# Patient Record
Sex: FEMALE | Race: WHITE | NOT HISPANIC OR LATINO | Employment: OTHER | URBAN - METROPOLITAN AREA
[De-identification: names, ages, dates, MRNs, and addresses within clinical notes are randomized per-mention and may not be internally consistent; named-entity substitution may affect disease eponyms.]

---

## 2017-12-28 ENCOUNTER — LAB REQUISITION (OUTPATIENT)
Dept: LAB | Facility: HOSPITAL | Age: 57
End: 2017-12-28

## 2017-12-28 DIAGNOSIS — K22.70 BARRETT'S ESOPHAGUS WITHOUT DYSPLASIA: ICD-10-CM

## 2017-12-28 PROCEDURE — 88305 TISSUE EXAM BY PATHOLOGIST: CPT | Performed by: INTERNAL MEDICINE

## 2017-12-29 LAB
CYTO UR: NORMAL
LAB AP CASE REPORT: NORMAL
LAB AP CLINICAL INFORMATION: NORMAL
LAB AP DIAGNOSIS COMMENT: NORMAL
Lab: NORMAL
PATH REPORT.FINAL DX SPEC: NORMAL
PATH REPORT.GROSS SPEC: NORMAL

## 2018-11-30 RX ORDER — EZETIMIBE 10 MG/1
TABLET ORAL
Qty: 30 TABLET | Refills: 10 | OUTPATIENT
Start: 2018-11-30

## 2018-12-03 RX ORDER — EZETIMIBE 10 MG/1
TABLET ORAL
Qty: 30 TABLET | Refills: 10 | OUTPATIENT
Start: 2018-12-03

## 2019-11-21 ENCOUNTER — OUTSIDE FACILITY SERVICE (OUTPATIENT)
Dept: GASTROENTEROLOGY | Facility: CLINIC | Age: 59
End: 2019-11-21

## 2019-11-21 ENCOUNTER — LAB REQUISITION (OUTPATIENT)
Dept: LAB | Facility: HOSPITAL | Age: 59
End: 2019-11-21

## 2019-11-21 DIAGNOSIS — K22.70 BARRETT'S ESOPHAGUS WITHOUT DYSPLASIA: ICD-10-CM

## 2019-11-21 PROCEDURE — 88305 TISSUE EXAM BY PATHOLOGIST: CPT | Performed by: INTERNAL MEDICINE

## 2019-11-21 PROCEDURE — 43239 EGD BIOPSY SINGLE/MULTIPLE: CPT | Performed by: INTERNAL MEDICINE

## 2019-11-21 PROCEDURE — 43249 ESOPH EGD DILATION <30 MM: CPT | Performed by: INTERNAL MEDICINE

## 2019-11-22 LAB
CYTO UR: NORMAL
LAB AP CASE REPORT: NORMAL
LAB AP CLINICAL INFORMATION: NORMAL
PATH REPORT.FINAL DX SPEC: NORMAL
PATH REPORT.GROSS SPEC: NORMAL

## 2020-12-27 ENCOUNTER — APPOINTMENT (OUTPATIENT)
Dept: PREADMISSION TESTING | Facility: HOSPITAL | Age: 60
End: 2020-12-27

## 2021-04-05 RX ORDER — SODIUM, POTASSIUM,MAG SULFATES 17.5-3.13G
2 SOLUTION, RECONSTITUTED, ORAL ORAL TAKE AS DIRECTED
Qty: 354 ML | Refills: 0 | Status: SHIPPED | OUTPATIENT
Start: 2021-04-05

## 2021-04-18 ENCOUNTER — APPOINTMENT (OUTPATIENT)
Dept: PREADMISSION TESTING | Facility: HOSPITAL | Age: 61
End: 2021-04-18

## 2021-04-18 PROCEDURE — U0005 INFEC AGEN DETEC AMPLI PROBE: HCPCS

## 2021-04-18 PROCEDURE — U0004 COV-19 TEST NON-CDC HGH THRU: HCPCS

## 2021-04-18 PROCEDURE — C9803 HOPD COVID-19 SPEC COLLECT: HCPCS

## 2021-04-19 LAB — SARS-COV-2 RNA NOSE QL NAA+PROBE: NOT DETECTED

## 2021-04-20 ENCOUNTER — OUTSIDE FACILITY SERVICE (OUTPATIENT)
Dept: GASTROENTEROLOGY | Facility: CLINIC | Age: 61
End: 2021-04-20

## 2021-04-20 PROCEDURE — G0105 COLORECTAL SCRN; HI RISK IND: HCPCS | Performed by: INTERNAL MEDICINE

## 2022-03-28 ENCOUNTER — TELEPHONE (OUTPATIENT)
Dept: GASTROENTEROLOGY | Facility: CLINIC | Age: 62
End: 2022-03-28

## 2022-03-28 NOTE — TELEPHONE ENCOUNTER
RETURNED PATIENT PHONE MESSAGE, SHE ASK IF WE TAKE HER INS. AND ALSO WHEN SHE IS DUE FOR HER NEXT COLON. 2026

## 2023-07-05 PROBLEM — K21.9 GASTROESOPHAGEAL REFLUX DISEASE: Status: ACTIVE | Noted: 2023-07-05

## 2023-07-05 PROBLEM — R10.32 LEFT LOWER QUADRANT ABDOMINAL PAIN: Status: ACTIVE | Noted: 2023-07-05

## 2023-07-05 PROBLEM — K31.84 GASTROPARESIS: Status: ACTIVE | Noted: 2023-07-05

## 2023-07-05 PROBLEM — K57.92 DIVERTICULITIS: Status: ACTIVE | Noted: 2023-07-05

## 2023-07-05 PROBLEM — R13.19 ESOPHAGEAL DYSPHAGIA: Status: ACTIVE | Noted: 2023-07-05

## 2023-07-26 ENCOUNTER — OUTSIDE FACILITY SERVICE (OUTPATIENT)
Dept: GASTROENTEROLOGY | Facility: CLINIC | Age: 63
End: 2023-07-26
Payer: MEDICARE

## 2023-07-26 DIAGNOSIS — K31.84 GASTROPARESIS: Primary | ICD-10-CM

## 2023-07-26 RX ORDER — METOCLOPRAMIDE HYDROCHLORIDE 15 MG/.07ML
15 SPRAY NASAL
Qty: 9.8 ML | Refills: 0 | COMMUNITY
Start: 2023-07-26

## 2023-07-26 NOTE — PROGRESS NOTES
I provided the patient with sample of Gimoti as requested by Dr. Cardona. I do not recommend use of oral metoclopramide that was previously prescribed with nasal metoclopramide. She may use Gimoti up to four times daily, 1 spray in 1 nostril daily.      NDC: 95826-918-62  [facility] [id number]  Lot 2006  Exp 12/2023

## 2023-08-31 ENCOUNTER — TELEPHONE (OUTPATIENT)
Dept: GASTROENTEROLOGY | Facility: CLINIC | Age: 63
End: 2023-08-31
Payer: MEDICARE

## 2023-08-31 NOTE — TELEPHONE ENCOUNTER
Pt called and stated that she is having a diverticulitis flare up.   Symptoms: Pain when she walks and to touch. She said it is the same feeling she always has cause she has enough flare ups to know the signs.   Duration: Started a couple days ago  Pt is requesting a antibiotic the same as the last time. She said it was a liquid.    Ruba  Okay to start her on a 14-day course of Augmentin 875 mg p.o. twice daily and if she is not coming along or any worsening symptoms such as fevers worsening pain then she needs to present to the emergency department for CT to rule out perforation or abscess.  Lets make sure she catches up with us after completing the antibiotics to give us an update on how she is doing

## 2023-11-06 ENCOUNTER — HOSPITAL ENCOUNTER (OUTPATIENT)
Dept: GENERAL RADIOLOGY | Facility: HOSPITAL | Age: 63
Discharge: HOME | End: 2023-11-06
Payer: MEDICARE

## 2023-11-06 ENCOUNTER — PRE-ADMISSION TESTING (OUTPATIENT)
Dept: PREADMISSION TESTING | Facility: HOSPITAL | Age: 63
End: 2023-11-06
Payer: MEDICARE

## 2023-11-06 VITALS — HEIGHT: 64 IN | BODY MASS INDEX: 26.5 KG/M2 | WEIGHT: 155.2 LBS

## 2023-11-06 LAB
ALBUMIN SERPL-MCNC: 4.3 G/DL (ref 3.5–5.2)
ALBUMIN/GLOB SERPL: 2 G/DL
ALP SERPL-CCNC: 66 U/L (ref 39–117)
ALT SERPL W P-5'-P-CCNC: 31 U/L (ref 1–33)
ANION GAP SERPL CALCULATED.3IONS-SCNC: 7 MMOL/L (ref 5–15)
AST SERPL-CCNC: 27 U/L (ref 1–32)
BILIRUB SERPL-MCNC: 0.5 MG/DL (ref 0–1.2)
BUN SERPL-MCNC: 14 MG/DL (ref 8–23)
BUN/CREAT SERPL: 16.9 (ref 7–25)
CALCIUM SPEC-SCNC: 8.9 MG/DL (ref 8.6–10.5)
CHLORIDE SERPL-SCNC: 101 MMOL/L (ref 98–107)
CO2 SERPL-SCNC: 32 MMOL/L (ref 22–29)
CREAT SERPL-MCNC: 0.83 MG/DL (ref 0.57–1)
DEPRECATED RDW RBC AUTO: 41.8 FL (ref 37–54)
EGFRCR SERPLBLD CKD-EPI 2021: 78.8 ML/MIN/1.73
ERYTHROCYTE [DISTWIDTH] IN BLOOD BY AUTOMATED COUNT: 12.7 % (ref 12.3–15.4)
GLOBULIN UR ELPH-MCNC: 2.1 GM/DL
GLUCOSE SERPL-MCNC: 93 MG/DL (ref 65–99)
HBA1C MFR BLD: 5.4 % (ref 4.8–5.6)
HCT VFR BLD AUTO: 42.9 % (ref 34–46.6)
HGB BLD-MCNC: 14 G/DL (ref 12–15.9)
MCH RBC QN AUTO: 29.4 PG (ref 26.6–33)
MCHC RBC AUTO-ENTMCNC: 32.6 G/DL (ref 31.5–35.7)
MCV RBC AUTO: 90.1 FL (ref 79–97)
PLATELET # BLD AUTO: 287 10*3/MM3 (ref 140–450)
PMV BLD AUTO: 8.7 FL (ref 6–12)
POTASSIUM SERPL-SCNC: 3.7 MMOL/L (ref 3.5–5.2)
PROT SERPL-MCNC: 6.4 G/DL (ref 6–8.5)
RBC # BLD AUTO: 4.76 10*6/MM3 (ref 3.77–5.28)
SODIUM SERPL-SCNC: 140 MMOL/L (ref 136–145)
WBC NRBC COR # BLD: 5.16 10*3/MM3 (ref 3.4–10.8)

## 2023-11-06 PROCEDURE — 80053 COMPREHEN METABOLIC PANEL: CPT

## 2023-11-06 PROCEDURE — 85027 COMPLETE CBC AUTOMATED: CPT

## 2023-11-06 PROCEDURE — 71046 X-RAY EXAM CHEST 2 VIEWS: CPT

## 2023-11-06 PROCEDURE — 36415 COLL VENOUS BLD VENIPUNCTURE: CPT

## 2023-11-06 PROCEDURE — 83036 HEMOGLOBIN GLYCOSYLATED A1C: CPT

## 2023-11-06 PROCEDURE — 93005 ELECTROCARDIOGRAM TRACING: CPT

## 2023-11-06 RX ORDER — FLUTICASONE PROPIONATE 50 MCG
SPRAY, SUSPENSION (ML) NASAL
Status: ON HOLD | COMMUNITY
Start: 2023-11-06

## 2023-11-06 RX ORDER — HYDROCODONE BITARTRATE AND ACETAMINOPHEN 10; 325 MG/1; MG/1
TABLET ORAL
Status: ON HOLD | COMMUNITY
Start: 2023-11-03

## 2023-11-06 NOTE — PAT
An arrival time for procedure was not provided during PAT visit. If patient had any questions or concerns about their arrival time, they were instructed to contact their surgeon/physician.  Additionally, if the patient referred to an arrival time that was acquired from their my chart account, patient was encouraged to verify that time with their surgeon/physician. Arrival times are NOT provided in Pre Admission Testing Department.    Patient viewed general PAT education video as instructed in their preoperative information received from their surgeon.  Patient stated the general PAT education video was viewed in its entirety and survey completed.  Copies of PAT general education handouts (Incentive Spirometry, Meds to Beds Program, Patient Belongings, Pre-op skin preparation instructions, Blood Glucose testing, Visitor policy, Surgery FAQ, Code H) distributed to patient if not printed. Education related to the PAT pass and skin preparation for surgery (if applicable) completed in PAT as a reinforcement to PAT education video. Patient instructed to return PAT pass provided today as well as completed skin preparation sheet (if applicable) on the day of procedure.     Additionally if patient had not viewed video yet but intended to view it at home or in our waiting area, then referred them to the handout with QR code/link provided during PAT visit.  Instructed patient to complete survey after viewing the video in its entirety.  Encouraged patient/family to read PAT general education handouts thoroughly and notify PAT staff with any questions or concerns. Patient verbalized understanding of all information and priority content.    Ten (8 ounce) Impact Advanced Recovery Nutritional Drinks distributed to patient from Pre Admission Testing Department.  Verbal and written instructions given that patient must consume two (8 ounce) Impact drinks a day for five days before surgery.  Flavoring tip sheet provided as well the  "brochure \"Go in Stronger. Get Home Sooner\" that explains benefits of nutritional drinks to enhance recovery. Patient/family verbalized understanding.     Patient instructed to drink 20 ounces of Gatorade or Gatorlyte (if diabetic) and it needs to be completed 1 hour (for Main OR patients) or 2 hours (scheduled  section & [facility] patients) before given arrival time for procedure (NO RED Gatorade and NO Gatorade Zero).    Patient verbalized understanding.    Patient denies any current skin issues.     Patient to apply Chlorhexadine wipes  to surgical area (as instructed) the night before procedure and the AM of procedure. Wipes provided.    EKG IN CHART.      Patient directed to Radiology Department for CXR after Pre Admission Testing Appointment.     "

## 2023-11-07 LAB
QT INTERVAL: 428 MS
QTC INTERVAL: 430 MS

## 2023-11-13 ENCOUNTER — ANESTHESIA EVENT (OUTPATIENT)
Dept: PERIOP | Facility: HOSPITAL | Age: 63
End: 2023-11-13
Payer: MEDICARE

## 2023-11-13 ENCOUNTER — ANESTHESIA EVENT CONVERTED (OUTPATIENT)
Dept: ANESTHESIOLOGY | Facility: HOSPITAL | Age: 63
End: 2023-11-13
Payer: MEDICARE

## 2023-11-13 ENCOUNTER — HOSPITAL ENCOUNTER (INPATIENT)
Facility: HOSPITAL | Age: 63
LOS: 4 days | Discharge: HOME HEALTH | End: 2023-11-17
Attending: COLON & RECTAL SURGERY | Admitting: COLON & RECTAL SURGERY
Payer: MEDICARE

## 2023-11-13 ENCOUNTER — ANESTHESIA (OUTPATIENT)
Dept: PERIOP | Facility: HOSPITAL | Age: 63
End: 2023-11-13
Payer: MEDICARE

## 2023-11-13 DIAGNOSIS — K57.32 DIVERTICULITIS OF LARGE INTESTINE WITHOUT PERFORATION OR ABSCESS WITHOUT BLEEDING: ICD-10-CM

## 2023-11-13 DIAGNOSIS — K31.84 GASTROPARESIS: Primary | ICD-10-CM

## 2023-11-13 DIAGNOSIS — R13.19 ESOPHAGEAL DYSPHAGIA: ICD-10-CM

## 2023-11-13 DIAGNOSIS — K57.92 DIVERTICULITIS: ICD-10-CM

## 2023-11-13 PROCEDURE — 25010000002 DIAZEPAM PER 5 MG: Performed by: COLON & RECTAL SURGERY

## 2023-11-13 PROCEDURE — 0DJD8ZZ INSPECTION OF LOWER INTESTINAL TRACT, VIA NATURAL OR ARTIFICIAL OPENING ENDOSCOPIC: ICD-10-PCS | Performed by: COLON & RECTAL SURGERY

## 2023-11-13 PROCEDURE — 25010000002 BUPIVACAINE (PF) 0.25 % SOLUTION: Performed by: ANESTHESIOLOGY

## 2023-11-13 PROCEDURE — 88302 TISSUE EXAM BY PATHOLOGIST: CPT | Performed by: COLON & RECTAL SURGERY

## 2023-11-13 PROCEDURE — 25810000003 LACTATED RINGERS PER 1000 ML: Performed by: ANESTHESIOLOGY

## 2023-11-13 PROCEDURE — 25010000002 ONDANSETRON PER 1 MG: Performed by: ANESTHESIOLOGY

## 2023-11-13 PROCEDURE — 25010000002 SUGAMMADEX 200 MG/2ML SOLUTION: Performed by: ANESTHESIOLOGY

## 2023-11-13 PROCEDURE — 25010000002 HEPARIN (PORCINE) PER 1000 UNITS: Performed by: COLON & RECTAL SURGERY

## 2023-11-13 PROCEDURE — 0DTJ0ZZ RESECTION OF APPENDIX, OPEN APPROACH: ICD-10-PCS | Performed by: COLON & RECTAL SURGERY

## 2023-11-13 PROCEDURE — 0TN70ZZ RELEASE LEFT URETER, OPEN APPROACH: ICD-10-PCS | Performed by: COLON & RECTAL SURGERY

## 2023-11-13 PROCEDURE — 25010000002 HYDROMORPHONE PER 4 MG: Performed by: COLON & RECTAL SURGERY

## 2023-11-13 PROCEDURE — 0DBN0ZZ EXCISION OF SIGMOID COLON, OPEN APPROACH: ICD-10-PCS | Performed by: COLON & RECTAL SURGERY

## 2023-11-13 PROCEDURE — 88307 TISSUE EXAM BY PATHOLOGIST: CPT | Performed by: COLON & RECTAL SURGERY

## 2023-11-13 PROCEDURE — 25010000002 FENTANYL CITRATE (PF) 100 MCG/2ML SOLUTION: Performed by: NURSE ANESTHETIST, CERTIFIED REGISTERED

## 2023-11-13 PROCEDURE — 0DBP0ZZ EXCISION OF RECTUM, OPEN APPROACH: ICD-10-PCS | Performed by: COLON & RECTAL SURGERY

## 2023-11-13 PROCEDURE — 25010000002 PROPOFOL 10 MG/ML EMULSION: Performed by: NURSE ANESTHETIST, CERTIFIED REGISTERED

## 2023-11-13 PROCEDURE — 25010000002 DEXAMETHASONE PER 1 MG: Performed by: NURSE ANESTHETIST, CERTIFIED REGISTERED

## 2023-11-13 PROCEDURE — 25010000002 ONDANSETRON PER 1 MG

## 2023-11-13 PROCEDURE — 25010000002 DEXAMETHASONE SODIUM PHOSPHATE 10 MG/ML SOLUTION: Performed by: ANESTHESIOLOGY

## 2023-11-13 PROCEDURE — 25010000002 ERTAPENEM PER 500 MG: Performed by: COLON & RECTAL SURGERY

## 2023-11-13 PROCEDURE — 25010000002 FENTANYL CITRATE (PF) 50 MCG/ML SOLUTION

## 2023-11-13 PROCEDURE — 25010000002 HYDROMORPHONE 1 MG/ML SOLUTION

## 2023-11-13 PROCEDURE — 25010000002 ONDANSETRON PER 1 MG: Performed by: COLON & RECTAL SURGERY

## 2023-11-13 DEVICE — PROXIMATE RELOADABLE LINEAR CUTTER WITH SAFETY LOCK-OUT.  55MM LINEAR CUTTER.
Type: IMPLANTABLE DEVICE | Site: ABDOMEN | Status: FUNCTIONAL
Brand: PROXIMATE

## 2023-11-13 DEVICE — ECHELON CIRCULAR POWERED STAPLER
Type: IMPLANTABLE DEVICE | Site: ABDOMEN | Status: FUNCTIONAL
Brand: ECHELON CIRCULAR

## 2023-11-13 RX ORDER — FENTANYL CITRATE 50 UG/ML
INJECTION, SOLUTION INTRAMUSCULAR; INTRAVENOUS
Status: COMPLETED
Start: 2023-11-13 | End: 2023-11-13

## 2023-11-13 RX ORDER — DEXAMETHASONE SODIUM PHOSPHATE 4 MG/ML
INJECTION, SOLUTION INTRA-ARTICULAR; INTRALESIONAL; INTRAMUSCULAR; INTRAVENOUS; SOFT TISSUE AS NEEDED
Status: DISCONTINUED | OUTPATIENT
Start: 2023-11-13 | End: 2023-11-13 | Stop reason: SURG

## 2023-11-13 RX ORDER — DIAZEPAM 5 MG/ML
5 INJECTION, SOLUTION INTRAMUSCULAR; INTRAVENOUS ONCE
Status: COMPLETED | OUTPATIENT
Start: 2023-11-13 | End: 2023-11-13

## 2023-11-13 RX ORDER — ENOXAPARIN SODIUM 100 MG/ML
40 INJECTION SUBCUTANEOUS DAILY
Status: DISCONTINUED | OUTPATIENT
Start: 2023-11-14 | End: 2023-11-17 | Stop reason: HOSPADM

## 2023-11-13 RX ORDER — MELOXICAM 15 MG/1
15 TABLET ORAL ONCE
Status: COMPLETED | OUTPATIENT
Start: 2023-11-13 | End: 2023-11-13

## 2023-11-13 RX ORDER — DEXAMETHASONE SODIUM PHOSPHATE 10 MG/ML
INJECTION, SOLUTION INTRAMUSCULAR; INTRAVENOUS
Status: COMPLETED | OUTPATIENT
Start: 2023-11-13 | End: 2023-11-13

## 2023-11-13 RX ORDER — SODIUM CHLORIDE 9 MG/ML
40 INJECTION, SOLUTION INTRAVENOUS AS NEEDED
Status: DISCONTINUED | OUTPATIENT
Start: 2023-11-13 | End: 2023-11-13 | Stop reason: HOSPADM

## 2023-11-13 RX ORDER — HEPARIN SODIUM 5000 [USP'U]/ML
5000 INJECTION, SOLUTION INTRAVENOUS; SUBCUTANEOUS ONCE
Status: COMPLETED | OUTPATIENT
Start: 2023-11-13 | End: 2023-11-13

## 2023-11-13 RX ORDER — SCOLOPAMINE TRANSDERMAL SYSTEM 1 MG/1
1 PATCH, EXTENDED RELEASE TRANSDERMAL ONCE
Status: DISCONTINUED | OUTPATIENT
Start: 2023-11-13 | End: 2023-11-13

## 2023-11-13 RX ORDER — ONDANSETRON 2 MG/ML
INJECTION INTRAMUSCULAR; INTRAVENOUS
Status: COMPLETED
Start: 2023-11-13 | End: 2023-11-13

## 2023-11-13 RX ORDER — BUPIVACAINE HYDROCHLORIDE 2.5 MG/ML
INJECTION, SOLUTION EPIDURAL; INFILTRATION; INTRACAUDAL
Status: COMPLETED | OUTPATIENT
Start: 2023-11-13 | End: 2023-11-13

## 2023-11-13 RX ORDER — ONDANSETRON 2 MG/ML
4 INJECTION INTRAMUSCULAR; INTRAVENOUS EVERY 6 HOURS PRN
Status: DISCONTINUED | OUTPATIENT
Start: 2023-11-13 | End: 2023-11-17 | Stop reason: HOSPADM

## 2023-11-13 RX ORDER — ONDANSETRON 2 MG/ML
4 INJECTION INTRAMUSCULAR; INTRAVENOUS ONCE AS NEEDED
Status: COMPLETED | OUTPATIENT
Start: 2023-11-13 | End: 2023-11-13

## 2023-11-13 RX ORDER — FENTANYL CITRATE 50 UG/ML
50 INJECTION, SOLUTION INTRAMUSCULAR; INTRAVENOUS
Status: DISCONTINUED | OUTPATIENT
Start: 2023-11-13 | End: 2023-11-13 | Stop reason: HOSPADM

## 2023-11-13 RX ORDER — LIDOCAINE HYDROCHLORIDE 10 MG/ML
INJECTION, SOLUTION EPIDURAL; INFILTRATION; INTRACAUDAL; PERINEURAL AS NEEDED
Status: DISCONTINUED | OUTPATIENT
Start: 2023-11-13 | End: 2023-11-13 | Stop reason: SURG

## 2023-11-13 RX ORDER — FENTANYL CITRATE 50 UG/ML
INJECTION, SOLUTION INTRAMUSCULAR; INTRAVENOUS AS NEEDED
Status: DISCONTINUED | OUTPATIENT
Start: 2023-11-13 | End: 2023-11-13 | Stop reason: SURG

## 2023-11-13 RX ORDER — DEXAMETHASONE SODIUM PHOSPHATE 10 MG/ML
INJECTION, SOLUTION INTRAMUSCULAR; INTRAVENOUS AS NEEDED
Status: DISCONTINUED | OUTPATIENT
Start: 2023-11-13 | End: 2023-11-13

## 2023-11-13 RX ORDER — SODIUM CHLORIDE 0.9 % (FLUSH) 0.9 %
10 SYRINGE (ML) INJECTION EVERY 12 HOURS SCHEDULED
Status: DISCONTINUED | OUTPATIENT
Start: 2023-11-13 | End: 2023-11-13 | Stop reason: HOSPADM

## 2023-11-13 RX ORDER — SODIUM CHLORIDE 0.9 % (FLUSH) 0.9 %
10 SYRINGE (ML) INJECTION AS NEEDED
Status: DISCONTINUED | OUTPATIENT
Start: 2023-11-13 | End: 2023-11-13 | Stop reason: HOSPADM

## 2023-11-13 RX ORDER — ONDANSETRON 2 MG/ML
INJECTION INTRAMUSCULAR; INTRAVENOUS AS NEEDED
Status: DISCONTINUED | OUTPATIENT
Start: 2023-11-13 | End: 2023-11-13 | Stop reason: SURG

## 2023-11-13 RX ORDER — LIDOCAINE HYDROCHLORIDE 10 MG/ML
0.5 INJECTION, SOLUTION EPIDURAL; INFILTRATION; INTRACAUDAL; PERINEURAL ONCE AS NEEDED
Status: COMPLETED | OUTPATIENT
Start: 2023-11-13 | End: 2023-11-13

## 2023-11-13 RX ORDER — ACETAMINOPHEN 500 MG
1000 TABLET ORAL EVERY 8 HOURS
Qty: 12 TABLET | Refills: 0 | Status: DISPENSED | OUTPATIENT
Start: 2023-11-13 | End: 2023-11-15

## 2023-11-13 RX ORDER — ROCURONIUM BROMIDE 10 MG/ML
INJECTION, SOLUTION INTRAVENOUS AS NEEDED
Status: DISCONTINUED | OUTPATIENT
Start: 2023-11-13 | End: 2023-11-13 | Stop reason: SURG

## 2023-11-13 RX ORDER — PHENYLEPHRINE HCL IN 0.9% NACL 1 MG/10 ML
SYRINGE (ML) INTRAVENOUS AS NEEDED
Status: DISCONTINUED | OUTPATIENT
Start: 2023-11-13 | End: 2023-11-13 | Stop reason: SURG

## 2023-11-13 RX ORDER — SODIUM CHLORIDE, SODIUM LACTATE, POTASSIUM CHLORIDE, CALCIUM CHLORIDE 600; 310; 30; 20 MG/100ML; MG/100ML; MG/100ML; MG/100ML
9 INJECTION, SOLUTION INTRAVENOUS CONTINUOUS
Status: DISCONTINUED | OUTPATIENT
Start: 2023-11-13 | End: 2023-11-14

## 2023-11-13 RX ORDER — MIDAZOLAM HYDROCHLORIDE 1 MG/ML
1 INJECTION INTRAMUSCULAR; INTRAVENOUS
Status: DISCONTINUED | OUTPATIENT
Start: 2023-11-13 | End: 2023-11-13 | Stop reason: HOSPADM

## 2023-11-13 RX ORDER — FAMOTIDINE 20 MG/1
20 TABLET, FILM COATED ORAL ONCE
Status: COMPLETED | OUTPATIENT
Start: 2023-11-13 | End: 2023-11-13

## 2023-11-13 RX ORDER — MAGNESIUM HYDROXIDE 1200 MG/15ML
LIQUID ORAL AS NEEDED
Status: DISCONTINUED | OUTPATIENT
Start: 2023-11-13 | End: 2023-11-13 | Stop reason: HOSPADM

## 2023-11-13 RX ORDER — HYDROMORPHONE HYDROCHLORIDE 2 MG/ML
0.5 INJECTION, SOLUTION INTRAMUSCULAR; INTRAVENOUS; SUBCUTANEOUS
Status: DISCONTINUED | OUTPATIENT
Start: 2023-11-13 | End: 2023-11-17 | Stop reason: HOSPADM

## 2023-11-13 RX ORDER — DIAZEPAM 5 MG/ML
INJECTION, SOLUTION INTRAMUSCULAR; INTRAVENOUS
Status: DISPENSED
Start: 2023-11-13 | End: 2023-11-14

## 2023-11-13 RX ORDER — PROPOFOL 10 MG/ML
VIAL (ML) INTRAVENOUS AS NEEDED
Status: DISCONTINUED | OUTPATIENT
Start: 2023-11-13 | End: 2023-11-13 | Stop reason: SURG

## 2023-11-13 RX ORDER — ALVIMOPAN 12 MG/1
12 CAPSULE ORAL 2 TIMES DAILY
Qty: 14 CAPSULE | Refills: 0 | Status: DISCONTINUED | OUTPATIENT
Start: 2023-11-14 | End: 2023-11-15

## 2023-11-13 RX ORDER — FAMOTIDINE 10 MG/ML
20 INJECTION, SOLUTION INTRAVENOUS ONCE
Status: CANCELLED | OUTPATIENT
Start: 2023-11-13 | End: 2023-11-13

## 2023-11-13 RX ORDER — SODIUM CHLORIDE 9 MG/ML
100 INJECTION, SOLUTION INTRAVENOUS ONCE
Status: DISCONTINUED | OUTPATIENT
Start: 2023-11-13 | End: 2023-11-16

## 2023-11-13 RX ORDER — PREGABALIN 75 MG/1
75 CAPSULE ORAL ONCE
Status: COMPLETED | OUTPATIENT
Start: 2023-11-13 | End: 2023-11-13

## 2023-11-13 RX ORDER — ACETAMINOPHEN 500 MG
1000 TABLET ORAL ONCE
Status: COMPLETED | OUTPATIENT
Start: 2023-11-13 | End: 2023-11-13

## 2023-11-13 RX ORDER — DIAZEPAM 5 MG/1
5 TABLET ORAL EVERY 8 HOURS PRN
Status: DISCONTINUED | OUTPATIENT
Start: 2023-11-13 | End: 2023-11-17 | Stop reason: HOSPADM

## 2023-11-13 RX ORDER — OXYCODONE HYDROCHLORIDE 5 MG/1
5 TABLET ORAL EVERY 4 HOURS PRN
Status: DISCONTINUED | OUTPATIENT
Start: 2023-11-13 | End: 2023-11-17 | Stop reason: HOSPADM

## 2023-11-13 RX ADMIN — PROPOFOL 200 MG: 10 INJECTION, EMULSION INTRAVENOUS at 14:58

## 2023-11-13 RX ADMIN — ROCURONIUM BROMIDE 50 MG: 10 SOLUTION INTRAVENOUS at 14:59

## 2023-11-13 RX ADMIN — LIDOCAINE HYDROCHLORIDE 0.5 ML: 10 INJECTION, SOLUTION EPIDURAL; INFILTRATION; INTRACAUDAL; PERINEURAL at 14:12

## 2023-11-13 RX ADMIN — SCOPOLAMINE 1 PATCH: 1.5 PATCH, EXTENDED RELEASE TRANSDERMAL at 14:12

## 2023-11-13 RX ADMIN — HEPARIN SODIUM 5000 UNITS: 5000 INJECTION, SOLUTION INTRAVENOUS; SUBCUTANEOUS at 14:12

## 2023-11-13 RX ADMIN — Medication 100 MCG: at 15:13

## 2023-11-13 RX ADMIN — FENTANYL CITRATE 50 MCG: 50 INJECTION, SOLUTION INTRAMUSCULAR; INTRAVENOUS at 16:58

## 2023-11-13 RX ADMIN — ACETAMINOPHEN 1000 MG: 500 TABLET ORAL at 14:12

## 2023-11-13 RX ADMIN — FENTANYL CITRATE 50 MCG: 50 INJECTION, SOLUTION INTRAMUSCULAR; INTRAVENOUS at 18:02

## 2023-11-13 RX ADMIN — FENTANYL CITRATE 50 MCG: 50 INJECTION, SOLUTION INTRAMUSCULAR; INTRAVENOUS at 17:15

## 2023-11-13 RX ADMIN — ONDANSETRON 4 MG: 2 INJECTION INTRAMUSCULAR; INTRAVENOUS at 16:29

## 2023-11-13 RX ADMIN — ONDANSETRON 4 MG: 2 INJECTION INTRAMUSCULAR; INTRAVENOUS at 17:00

## 2023-11-13 RX ADMIN — SODIUM CHLORIDE, POTASSIUM CHLORIDE, SODIUM LACTATE AND CALCIUM CHLORIDE 9 ML/HR: 600; 310; 30; 20 INJECTION, SOLUTION INTRAVENOUS at 14:13

## 2023-11-13 RX ADMIN — PROPOFOL 25 MCG/KG/MIN: 10 INJECTION, EMULSION INTRAVENOUS at 15:21

## 2023-11-13 RX ADMIN — FENTANYL CITRATE 50 MCG: 50 INJECTION, SOLUTION INTRAMUSCULAR; INTRAVENOUS at 17:23

## 2023-11-13 RX ADMIN — MELOXICAM 15 MG: 15 TABLET ORAL at 14:12

## 2023-11-13 RX ADMIN — BUPIVACAINE HYDROCHLORIDE 60 ML: 2.5 INJECTION, SOLUTION EPIDURAL; INFILTRATION; INTRACAUDAL; PERINEURAL at 15:00

## 2023-11-13 RX ADMIN — FENTANYL CITRATE 50 MCG: 50 INJECTION, SOLUTION INTRAMUSCULAR; INTRAVENOUS at 15:24

## 2023-11-13 RX ADMIN — FAMOTIDINE 20 MG: 20 TABLET, FILM COATED ORAL at 14:12

## 2023-11-13 RX ADMIN — ONDANSETRON 4 MG: 2 INJECTION INTRAMUSCULAR; INTRAVENOUS at 18:43

## 2023-11-13 RX ADMIN — Medication 100 MCG: at 15:16

## 2023-11-13 RX ADMIN — DIAZEPAM 5 MG: 5 TABLET ORAL at 20:22

## 2023-11-13 RX ADMIN — ERTAPENEM SODIUM 1000 MG: 1 INJECTION, POWDER, LYOPHILIZED, FOR SOLUTION INTRAMUSCULAR; INTRAVENOUS at 14:58

## 2023-11-13 RX ADMIN — DIAZEPAM 5 MG: 10 INJECTION, SOLUTION INTRAMUSCULAR; INTRAVENOUS at 17:28

## 2023-11-13 RX ADMIN — LIDOCAINE HYDROCHLORIDE 50 MG: 10 INJECTION, SOLUTION EPIDURAL; INFILTRATION; INTRACAUDAL; PERINEURAL at 14:58

## 2023-11-13 RX ADMIN — OXYCODONE HYDROCHLORIDE 5 MG: 5 TABLET ORAL at 20:22

## 2023-11-13 RX ADMIN — SUGAMMADEX 200 MG: 100 INJECTION, SOLUTION INTRAVENOUS at 16:34

## 2023-11-13 RX ADMIN — HYDROMORPHONE HYDROCHLORIDE 0.5 MG: 1 INJECTION, SOLUTION INTRAMUSCULAR; INTRAVENOUS; SUBCUTANEOUS at 17:10

## 2023-11-13 RX ADMIN — DEXAMETHASONE SODIUM PHOSPHATE 4 MG: 10 INJECTION, SOLUTION INTRAMUSCULAR; INTRAVENOUS at 15:00

## 2023-11-13 RX ADMIN — DEXAMETHASONE SODIUM PHOSPHATE 4 MG: 4 INJECTION, SOLUTION INTRAMUSCULAR; INTRAVENOUS at 15:21

## 2023-11-13 RX ADMIN — FENTANYL CITRATE 50 MCG: 50 INJECTION, SOLUTION INTRAMUSCULAR; INTRAVENOUS at 14:58

## 2023-11-13 RX ADMIN — PREGABALIN 75 MG: 75 CAPSULE ORAL at 14:12

## 2023-11-13 RX ADMIN — Medication 100 MCG: at 15:37

## 2023-11-13 RX ADMIN — HYDROMORPHONE HYDROCHLORIDE 0.5 MG: 2 INJECTION, SOLUTION INTRAMUSCULAR; INTRAVENOUS; SUBCUTANEOUS at 18:43

## 2023-11-13 NOTE — ANESTHESIA PROCEDURE NOTES
Peripheral Block      Patient reassessed immediately prior to procedure    Patient location during procedure: OR  Reason for block: at surgeon's request and post-op pain management  Preanesthetic Checklist  Completed: patient identified, IV checked, site marked, risks and benefits discussed, surgical consent, monitors and equipment checked, pre-op evaluation and timeout performed  Prep:  Pt Position: supine  Sterile barriers:cap, gloves, mask and washed/disinfected hands  Prep: ChloraPrep  Patient monitoring: blood pressure monitoring, continuous pulse oximetry and EKG  Procedure    Sedation: yes  Performed under: general  Guidance:ultrasound guided  Images:still images obtained, printed/placed on chart    Laterality:Bilateral  Block Type:TAP  Injection Technique:single-shot  Needle Type:short-bevel and echogenic  Needle Gauge:20 G  Resistance on Injection: none          Medications  Comment:Block Injection:  LA dose divided between Right and Left block        Post Assessment  Injection Assessment: negative aspiration for heme, incremental injection and no paresthesia on injection  Patient Tolerance:comfortable throughout block  Complications:no  Additional Notes  {CKA TAP 4 QUAD:50422}

## 2023-11-13 NOTE — ANESTHESIA PROCEDURE NOTES
Peripheral Block    Pre-sedation assessment completed: 11/13/2023 2:58 PM    Patient reassessed immediately prior to procedure    Patient location during procedure: OR  Start time: 11/13/2023 3:00 PM  Stop time: 11/13/2023 3:04 PM  Reason for block: at surgeon's request and post-op pain management  Performed by  Anesthesiologist: Dale Reinoso MD  Preanesthetic Checklist  Completed: patient identified, IV checked, site marked, risks and benefits discussed, surgical consent, monitors and equipment checked, pre-op evaluation and timeout performed  Prep:  Pt Position: supine  Sterile barriers:cap, gloves, mask and washed/disinfected hands  Prep: ChloraPrep  Patient monitoring: blood pressure monitoring, continuous pulse oximetry and EKG  Procedure    Sedation: yes  Performed under: general  Guidance:ultrasound guided    ULTRASOUND INTERPRETATION.  Using ultrasound guidance a 20 G gauge needle was placed in close proximity to the nerve, at which point, under ultrasound guidance anesthetic was injected in the area of the nerve and spread of the anesthesia was seen on ultrasound in close proximity thereto.  There were no abnormalities seen on ultrasound; a digital image was taken; and the patient tolerated the procedure with no complications. Images:still images obtained, printed/placed on chart    Laterality:Bilateral  Block Type:TAP  Injection Technique:single-shot  Needle Type:short-bevel and echogenic  Needle Gauge:20 G  Resistance on Injection: none    Medications Used: dexamethasone sodium phosphate injection - Injection   4 mg - 11/13/2023 3:00:00 PM  bupivacaine PF (MARCAINE) 0.25 % injection - Injection   60 mL - 11/13/2023 3:00:00 PM      Medications  Comment:Block Injection:  LA dose divided between Right and Left block        Post Assessment  Injection Assessment: negative aspiration for heme, incremental injection and no paresthesia on injection  Patient Tolerance:comfortable throughout  "block  Complications:no  Additional Notes    Subcostal TAPs    A high-frequency linear transducer, with sterile cover, was placed sub-xiphoid to identify Line[place] Alba, right and left Rectus Abdominus Muscles (MARISEL). The transducer was moved either right or left subcostally to identify the MARISEL and the Transverse Abdominus Muscle (MASON). The insertion site was prepped in sterile fashion and then localized with 2-5 ml of 1% Lidocaine. Using ultrasound-guidance, a 20-gauge B-Marin 4\" Ultraplex 360 non-stimulating echogenic needle was advanced in plane, from medial to lateral, until the tip of the needle was in the fascial plane between the MARISEL and MASON. 1-3ml of preservative free normal saline was used to hydro-dissect the fascial planes. After the fascial plane was verified, the local anesthetic (LA) was injected. The procedure was repeated on the opposite side for bilateral coverage. Aspiration every 5 ml to prevent intravascular injection. Injection was completed with negative aspiration of blood and negative intravascular injection. Injection pressures were normal with minimal resistance. The subcostal approach to the TAP nerve block ideally anesthetizes the intercostal nerves T6-T9.     Mid-Axillary/Lateral TAPs    A high-frequency linear transducer, with sterile cover, was placed in the midaxillary line between the ASIS and costal margin. The External Oblique Muscle (EOM), Internal Oblique Muscle (IOM), Transverse Abdominus Muscle (MASON), and Peritoneum were identified. The insertion site was prepped in sterile fashion and then localized with 2-5 ml of 1% Lidocaine. Using ultrasound-guidance, a 20-gauge B-Marin 4\" Ultraplex 360 non-stimulating echogenic needle was advanced in plane, from medial to lateral, until the tip of the needle was in the fascial plane between the IOM and MASON. 1-3ml of preservative free normal saline was used to hydro-dissect the fascial planes. After the fascial plane was verified, the local " anesthetic (LA) was injected. The procedure was repeated on the opposite side for bilateral coverage. Aspiration every 5 ml to prevent intravascular injection. Injection was completed with negative aspiration of blood and negative intravascular injection. Injection pressures were normal with minimal resistance. Midaxillary TAPs should reach intercostal nerves T10- T11 and the subcostal nerve T12.

## 2023-11-13 NOTE — ANESTHESIA PREPROCEDURE EVALUATION
Anesthesia Evaluation     Patient summary reviewed and Nursing notes reviewed                Airway   Mallampati: II  TM distance: >3 FB  Neck ROM: full  No difficulty expected  Dental - normal exam     Pulmonary - negative pulmonary ROS and normal exam   Cardiovascular - normal exam    (+) hyperlipidemia      Neuro/Psych- negative ROS  GI/Hepatic/Renal/Endo    (+) GERD    ROS Comment: Diverticulitis     Musculoskeletal (-) negative ROS    Abdominal  - normal exam    Bowel sounds: normal.   Substance History - negative use     OB/GYN negative ob/gyn ROS         Other                      Anesthesia Plan    ASA 2     general with block     intravenous induction     Anesthetic plan, risks, benefits, and alternatives have been provided, discussed and informed consent has been obtained with: patient.    Plan discussed with CRNA.      CODE STATUS:

## 2023-11-13 NOTE — INTERVAL H&P NOTE
[facility] Pre-op    Full history and physical note from office is attached.    VS: /82  HR 70  RR 16  T 98.2  Sat 96%RA    Immunizations:  Influenza:  No  Pneumococcal:  No  Tetanus:  No  Covid : No    LAB Results:  Lab Results   Component Value Date    WBC 5.16 11/06/2023    HGB 14.0 11/06/2023    HCT 42.9 11/06/2023    MCV 90.1 11/06/2023     11/06/2023    GLUCOSE 93 11/06/2023    BUN 14 11/06/2023    CREATININE 0.83 11/06/2023     11/06/2023    K 3.7 11/06/2023     11/06/2023    CO2 32.0 (H) 11/06/2023    CALCIUM 8.9 11/06/2023    ALBUMIN 4.3 11/06/2023    AST 27 11/06/2023    ALT 31 11/06/2023    BILITOT 0.5 11/06/2023       Cancer Staging (if applicable)  Cancer Patient: __ yes _z_no __unknown__N/A; If yes, clinical stage T:__ N:__M:__, stage group or __N/A      Impression: diverticulitis of large intestine       Plan: LOW ANTERIOR RESECTION; APPENDECTOMY       MICKIE Powell   11/13/2023   13:52 EST

## 2023-11-13 NOTE — OP NOTE
Colon and Rectal [[facility]]    COLON RESECTION LOW ANTERIOR, APPENDECTOMY  Procedure Note    Shobha Parada  11/13/2023    Pre-op Diagnosis:   Chronic sigmoid diverticulitis    Post-op Diagnosis:     Chronic sigmoid diverticulitis  Extensive pelvic adhesions  Probable remote appendicitis    Procedure(s):  LOW ANTERIOR RESECTION with takedown splenic flexure, left ureterolysis, extensive pelvic enterolysis and proctoscopy  APPENDECTOMY    Surgeon(s):  Chago Castillo MD    Circulator: Jorge Burnett RN  Scrub Person: Estrella Velarde  Assistant: Kirstie Ascencio PA-C     Assistant: Kirstie Ascencio PA-C was responsible for performing the following activities. First assist and their skilled assistance was necessary for the success of this case.      Anesthesia: General    Staff:   Circulator: Jorge Burnett RN  Scrub Person: Estrella Velarde  Assistant: Kirstie Ascencio PA-C      Estimated Blood Loss: 50 mL    Specimens:                Order Name Source Comment Collection Info Order Time   TISSUE PATHOLOGY EXAM Large Intestine, Sigmoid Colon  Collected By: Chago Castillo MD 11/13/2023  4:04 PM     Release to patient   Routine Release              Drains:   Urethral Catheter 16 Fr. (Active)       Findings: Dense pelvic adhesions deep to the cul-de-sac with the sigmoid colon folded down to the anterior rectum.  Inflammation surrounding the distal left ureter and stuck to the apex of the vagina and posterior bladder.  Remote CATINA and BSO.  Liver had a 12 mm blue dome cyst on the right anterior surface.  Extensive small bowel adhesions but no enterotomies.    Technique: Ms. Parada was taken to the operating room and placed under general anesthesia and positioned in lithotomy position on a beanbag with a Simeon catheter a tap block end Jarred stirrups.  The abdomen was prepped and draped appropriately and after timeout a lower vertical incision was made.  Extensive enterolysis from the get go.  The omentum was deeply  adherent and eventually it was teased off the rectum vagina bladder and even the right colon.  The small bowel was then dissected out of the same area.    The left colon was mobilized along the line of Toldt and the splenic flexure taken down.  The left ureter was quite free proximally and then it was delicately dissected below the promontory through the bladder.    There was a mass effect deep in the pelvis a sizing treatment facilitated identifying the boundaries of the rectum which allowed me to dissect the sigmoid colon off the front of the rectum and finally flippant out of the pelvis.  Considerable scarring and in usual fashion across the anterior rectum initially made it difficult to get the sizing instrument up.  It also facilitated cutting those scars without damaging the rectum.  We are not near the right ureter but we identified to make sure.  The decussation was identified in the mesorectum divided and ligated at that point.  A linear 55 blue stapler was used to divide the upper rectum and then high ligation of the sigmoid vessels was carried out.  Point was chosen on the left distal colon that was free of diverticular disease and the bowel was divided in the sigmoid colon taken to the back table.  A 29 CEEA anvil was sewn into the left colon with a 2-0 Prolene pursestring.  The anterior rectum was cleaned of adventitia and then the stapler advanced and the trocar came out the anterior rectum with the staple line reflected posterior.  Left colon was lined up anatomically, snapped in the place and the anastomosis carried out uneventfully.  The left colon was occluded with saline in the pelvis and proctoscopy was carried out showing an airtight anastomosis with good blood supply at about 16 cm.    I then went to the patient's left side and mobilized the cecum.  The base of the appendix was normal but the distal 2 inches was threadlike suggesting prior appendicitis.  The mesoappendix was divided and  ligated.  A 0 Vicryl was put across the base the appendix which was then amputated and the mucosal bud cauterized.    The abdomen was irrigated with saline and made hemostatic.  GI contents were placed back anatomically with the omentum in the pelvis.  With the sponge instrument needle count reportedly being correct, the fascia was closed in a single layer with #1 PDS.  The subcutaneous tissue was irrigated with saline and made hemostatic and the skin closed with staples.  A Telfa dressing was applied.  Ms. Parada tolerated the procedure well was taken to the recovery room in satisfactory condition.  I spoke with her daughter immediately afterwards.    Complications: 0      Chago Castillo MD     Date: 11/13/2023  Time: 16:48 EST

## 2023-11-13 NOTE — ANESTHESIA POSTPROCEDURE EVALUATION
Patient: Shobha Parada    Procedure Summary       Date: 11/13/23 Room / Location:  [facility] 04 /  [facility]    Anesthesia Start: 1455 Anesthesia Stop: 1655    Procedures:       LOW ANTERIOR RESECTION (Abdomen)      APPENDECTOMY (Abdomen) Diagnosis:     Surgeons: Chago Castillo MD Provider: Kane Ulrich MD    Anesthesia Type: general with block ASA Status: 2            Anesthesia Type: general with block    Vitals  Vitals Value Taken Time   BP     Temp     Pulse 68 11/13/23 1655   Resp     SpO2 100 % 11/13/23 1655   Vitals shown include unfiled device data.        Post Anesthesia Care and Evaluation    Patient location during evaluation: PACU  Patient participation: complete - patient participated  Level of consciousness: lethargic  Pain score: 3  Pain management: adequate    Airway patency: patent  Anesthetic complications: No anesthetic complications  PONV Status: noneRespiratory status: acceptable  Hydration status: acceptable

## 2023-11-13 NOTE — ANESTHESIA PROCEDURE NOTES
Airway  Urgency: elective    Date/Time: 11/13/2023 3:00 PM  Airway not difficult    General Information and Staff    Patient location during procedure: OR  CRNA/CAA: Anna Galindo CRNA    Indications and Patient Condition  Indications for airway management: airway protection    Preoxygenated: yes  MILS not maintained throughout  Mask difficulty assessment: 1 - vent by mask    Final Airway Details  Final airway type: endotracheal airway      Successful airway: ETT  Cuffed: yes   Successful intubation technique: video laryngoscopy  Facilitating devices/methods: intubating stylet  Endotracheal tube insertion site: oral  Blade: Rodriguez  Blade size: 3  ETT size (mm): 7.0  Cormack-Lehane Classification: grade IIa - partial view of glottis  Placement verified by: chest auscultation and capnometry   Measured from: lips  ETT/EBT  to lips (cm): 21  Number of attempts at approach: 1  Assessment: lips, teeth, and gum same as pre-op and atraumatic intubation    Additional Comments  Negative epigastric sounds, Breath sound equal bilaterally with symmetric chest rise and fall.  Rodriguez used due to limited neck ROM.

## 2023-11-14 PROCEDURE — 97161 PT EVAL LOW COMPLEX 20 MIN: CPT

## 2023-11-14 PROCEDURE — 25010000002 ENOXAPARIN PER 10 MG: Performed by: COLON & RECTAL SURGERY

## 2023-11-14 PROCEDURE — 25010000002 ONDANSETRON PER 1 MG: Performed by: COLON & RECTAL SURGERY

## 2023-11-14 PROCEDURE — 99232 SBSQ HOSP IP/OBS MODERATE 35: CPT | Performed by: NURSE PRACTITIONER

## 2023-11-14 PROCEDURE — 97116 GAIT TRAINING THERAPY: CPT

## 2023-11-14 PROCEDURE — 25010000002 HYDROMORPHONE PER 4 MG: Performed by: COLON & RECTAL SURGERY

## 2023-11-14 RX ORDER — SIMETHICONE 80 MG
80 TABLET,CHEWABLE ORAL 4 TIMES DAILY PRN
Status: DISCONTINUED | OUTPATIENT
Start: 2023-11-14 | End: 2023-11-15

## 2023-11-14 RX ORDER — BISACODYL 10 MG
10 SUPPOSITORY, RECTAL RECTAL DAILY PRN
Status: DISCONTINUED | OUTPATIENT
Start: 2023-11-14 | End: 2023-11-17

## 2023-11-14 RX ORDER — FLUTICASONE PROPIONATE 50 MCG
2 SPRAY, SUSPENSION (ML) NASAL NIGHTLY PRN
Status: DISCONTINUED | OUTPATIENT
Start: 2023-11-14 | End: 2023-11-17 | Stop reason: HOSPADM

## 2023-11-14 RX ORDER — AMOXICILLIN 250 MG
2 CAPSULE ORAL 2 TIMES DAILY
Status: DISCONTINUED | OUTPATIENT
Start: 2023-11-14 | End: 2023-11-17

## 2023-11-14 RX ORDER — PANTOPRAZOLE SODIUM 40 MG/1
40 TABLET, DELAYED RELEASE ORAL
Status: DISCONTINUED | OUTPATIENT
Start: 2023-11-15 | End: 2023-11-17 | Stop reason: HOSPADM

## 2023-11-14 RX ORDER — CETIRIZINE HYDROCHLORIDE 10 MG/1
10 TABLET ORAL DAILY
Status: DISCONTINUED | OUTPATIENT
Start: 2023-11-14 | End: 2023-11-17 | Stop reason: HOSPADM

## 2023-11-14 RX ORDER — POLYETHYLENE GLYCOL 3350 17 G/17G
17 POWDER, FOR SOLUTION ORAL DAILY PRN
Status: DISCONTINUED | OUTPATIENT
Start: 2023-11-14 | End: 2023-11-17

## 2023-11-14 RX ORDER — CARVEDILOL 6.25 MG/1
6.25 TABLET ORAL DAILY
Status: DISCONTINUED | OUTPATIENT
Start: 2023-11-14 | End: 2023-11-17 | Stop reason: HOSPADM

## 2023-11-14 RX ORDER — GUAIFENESIN 600 MG/1
1200 TABLET, EXTENDED RELEASE ORAL DAILY PRN
Status: DISCONTINUED | OUTPATIENT
Start: 2023-11-14 | End: 2023-11-17 | Stop reason: HOSPADM

## 2023-11-14 RX ORDER — BISACODYL 5 MG/1
5 TABLET, DELAYED RELEASE ORAL DAILY PRN
Status: DISCONTINUED | OUTPATIENT
Start: 2023-11-14 | End: 2023-11-17

## 2023-11-14 RX ORDER — CHOLECALCIFEROL (VITAMIN D3) 125 MCG
5 CAPSULE ORAL NIGHTLY
Status: DISCONTINUED | OUTPATIENT
Start: 2023-11-14 | End: 2023-11-17 | Stop reason: HOSPADM

## 2023-11-14 RX ORDER — ATORVASTATIN CALCIUM 40 MG/1
40 TABLET, FILM COATED ORAL NIGHTLY
Status: DISCONTINUED | OUTPATIENT
Start: 2023-11-14 | End: 2023-11-17 | Stop reason: HOSPADM

## 2023-11-14 RX ORDER — HYDROCHLOROTHIAZIDE 25 MG/1
25 TABLET ORAL DAILY
Status: DISCONTINUED | OUTPATIENT
Start: 2023-11-14 | End: 2023-11-17 | Stop reason: HOSPADM

## 2023-11-14 RX ORDER — ASCORBIC ACID 500 MG
500 TABLET ORAL DAILY
Status: DISCONTINUED | OUTPATIENT
Start: 2023-11-14 | End: 2023-11-17 | Stop reason: HOSPADM

## 2023-11-14 RX ORDER — CALCIUM CARBONATE 500 MG/1
2 TABLET, CHEWABLE ORAL 2 TIMES DAILY PRN
Status: DISCONTINUED | OUTPATIENT
Start: 2023-11-14 | End: 2023-11-17 | Stop reason: HOSPADM

## 2023-11-14 RX ADMIN — ONDANSETRON 4 MG: 2 INJECTION INTRAMUSCULAR; INTRAVENOUS at 09:47

## 2023-11-14 RX ADMIN — ENOXAPARIN SODIUM 40 MG: 100 INJECTION SUBCUTANEOUS at 09:42

## 2023-11-14 RX ADMIN — SENNOSIDES AND DOCUSATE SODIUM 2 TABLET: 8.6; 5 TABLET ORAL at 22:17

## 2023-11-14 RX ADMIN — CARVEDILOL 6.25 MG: 6.25 TABLET, FILM COATED ORAL at 20:21

## 2023-11-14 RX ADMIN — GUAIFENESIN 1200 MG: 600 TABLET, EXTENDED RELEASE ORAL at 20:20

## 2023-11-14 RX ADMIN — DIAZEPAM 5 MG: 5 TABLET ORAL at 03:58

## 2023-11-14 RX ADMIN — OXYCODONE HYDROCHLORIDE 5 MG: 5 TABLET ORAL at 09:41

## 2023-11-14 RX ADMIN — HYDROMORPHONE HYDROCHLORIDE 0.5 MG: 2 INJECTION, SOLUTION INTRAMUSCULAR; INTRAVENOUS; SUBCUTANEOUS at 20:22

## 2023-11-14 RX ADMIN — DIAZEPAM 5 MG: 5 TABLET ORAL at 22:16

## 2023-11-14 RX ADMIN — ONDANSETRON 4 MG: 2 INJECTION INTRAMUSCULAR; INTRAVENOUS at 16:57

## 2023-11-14 RX ADMIN — CALCIUM CARBONATE (ANTACID) CHEW TAB 500 MG 2 TABLET: 500 CHEW TAB at 21:29

## 2023-11-14 RX ADMIN — HYDROMORPHONE HYDROCHLORIDE 0.5 MG: 2 INJECTION, SOLUTION INTRAMUSCULAR; INTRAVENOUS; SUBCUTANEOUS at 06:29

## 2023-11-14 RX ADMIN — CETIRIZINE HYDROCHLORIDE 10 MG: 10 TABLET, FILM COATED ORAL at 20:20

## 2023-11-14 RX ADMIN — ALVIMOPAN 12 MG: 12 CAPSULE ORAL at 20:20

## 2023-11-14 RX ADMIN — ALVIMOPAN 12 MG: 12 CAPSULE ORAL at 09:41

## 2023-11-14 RX ADMIN — ONDANSETRON 4 MG: 2 INJECTION INTRAMUSCULAR; INTRAVENOUS at 00:04

## 2023-11-14 RX ADMIN — HYDROMORPHONE HYDROCHLORIDE 0.5 MG: 2 INJECTION, SOLUTION INTRAMUSCULAR; INTRAVENOUS; SUBCUTANEOUS at 16:57

## 2023-11-14 RX ADMIN — HYDROMORPHONE HYDROCHLORIDE 0.5 MG: 2 INJECTION, SOLUTION INTRAMUSCULAR; INTRAVENOUS; SUBCUTANEOUS at 00:05

## 2023-11-14 RX ADMIN — HYDROCHLOROTHIAZIDE 25 MG: 25 TABLET ORAL at 20:21

## 2023-11-14 RX ADMIN — ONDANSETRON 4 MG: 2 INJECTION INTRAMUSCULAR; INTRAVENOUS at 22:16

## 2023-11-14 RX ADMIN — ACETAMINOPHEN 1000 MG: 500 TABLET ORAL at 03:58

## 2023-11-14 RX ADMIN — HYDROMORPHONE HYDROCHLORIDE 0.5 MG: 2 INJECTION, SOLUTION INTRAMUSCULAR; INTRAVENOUS; SUBCUTANEOUS at 11:21

## 2023-11-14 RX ADMIN — SIMETHICONE 80 MG: 80 TABLET, CHEWABLE ORAL at 18:34

## 2023-11-14 NOTE — THERAPY EVALUATION
Patient Name: Shobha Parada  : 1960    MRN: [id number]                              Today's Date: 2023       Admit Date: 2023    Visit Dx:     ICD-10-CM ICD-9-CM   1. Diverticulitis  K57.92 562.11     Patient Active Problem List   Diagnosis    Gastroparesis    Esophageal dysphagia    Gastroesophageal reflux disease    Diverticulitis    Left lower quadrant abdominal pain     Past Medical History:   Diagnosis Date    Perez esophagus     Cholelithiasis     Colon polyp     Diverticulitis of colon     Elevated cholesterol     GERD (gastroesophageal reflux disease)     Hyperlipidemia     Irritable bowel syndrome     Kidney stones     Palpitations      Past Surgical History:   Procedure Laterality Date    ABDOMINAL HYSTERECTOMY      with unilateral oopherectomy    ANTERIOR CERVICAL DISCECTOMY W/ FUSION      ANTERIOR CERVICAL DISCECTOMY W/ FUSION      ANTERIOR CERVICAL DISCECTOMY W/ FUSION      BREAST EXCISIONAL BIOPSY Left     x 4    CERVIX REMOVAL      open with oopherectomy    CHOLECYSTECTOMY      COLONOSCOPY      EXPLORATORY LAPAROTOMY        General Information       Row Name 23 0915          Physical Therapy Time and Intention    Document Type evaluation  -     Mode of Treatment physical therapy  -       Row Name 23 0915          General Information    Patient Profile Reviewed yes  -     Prior Level of Function independent:;all household mobility;community mobility;gait;ADL's;grooming;dressing;bathing;driving;shopping  No AD use at baseline  -     Existing Precautions/Restrictions other (see comments)  abd wound  -     Barriers to Rehab none identified  -       Row Name 23 0915          Living Environment    People in Home alone  Dtr will be staying with her for a few weeks after d/c  -       Row Name 23 0915          Home Main Entrance    Number of Stairs, Main Entrance none  -       Row Name 23 0915          Stairs Within Home, Primary     Number of Stairs, Within Home, Primary none  -[facility] Name 11/14/23 0915          Cognition    Orientation Status (Cognition) oriented x 4  -[facility] Name 11/14/23 0915          Safety Issues, Functional Mobility    Safety Issues Affecting Function (Mobility) insight into deficits/self-awareness;safety precaution awareness;sequencing abilities  -     Impairments Affecting Function (Mobility) balance;endurance/activity tolerance;pain;strength;shortness of breath  -               User Key  (r) = Recorded By, (t) = Taken By, (c) = Cosigned By      Initials Name Provider Type     Lorena Chapman PT Physical Therapist                   Mobility       [facility] Name 11/14/23 0917          Bed Mobility    Bed Mobility supine-sit  -     Supine-Sit [place] (Bed Mobility) minimum assist (75% patient effort);verbal cues  -     Assistive Device (Bed Mobility) bed rails;head of bed elevated  -     Comment, (Bed Mobility) VCs for hand placement and sequencing. Educated on log roll technique to decrease abd pain. Requires increased time and effort d/t pain  -[facility] Name 11/14/23 0917          Transfers    Comment, (Transfers) VCs for hand placement and sequencing. Requires increased time and effort  -[facility] Name 11/14/23 0917          Sit-Stand Transfer    Sit-Stand [place] (Transfers) contact guard;verbal cues  -     Assistive Device (Sit-Stand Transfers) walker, front-wheeled  -     Comment, (Sit-Stand Transfer) STS x1 from EOB w/o FWW, x1 from toilet w/ FWW  -[facility] Name 11/14/23 0917          Gait/Stairs (Locomotion)    [place] Level (Gait) contact guard;verbal cues  -     Assistive Device (Gait) walker, front-wheeled  [facility]     Distance in Feet (Gait) 90  -     Deviations/Abnormal Patterns (Gait) bilateral deviations;base of support, narrow;teressa decreased;gait speed decreased;stride length decreased  -     Bilateral Gait Deviations forward flexed posture;heel  strike decreased  -     Comment, (Gait/Stairs) Pt demo step through gait pattern w/ decreased gait speed. VCs for sequencing w/ FWW. Cues for upright posture and to stay close to walker. Pt c/o increased nausea during ambulation. Distance limited by pain and fatigue  -               User Key  (r) = Recorded By, (t) = Taken By, (c) = Cosigned By      Initials Name Provider Type     Lorena Chapman PT Physical Therapist                   Obj/Interventions       [facility] Name 11/14/23 0921          Range of Motion Comprehensive    General Range of Motion bilateral lower extremity ROM WFL  -[facility] Name 11/14/23 0921          Strength Comprehensive (MMT)    General Manual Muscle Testing (MMT) Assessment lower extremity strength deficits identified  -     Comment, General Manual Muscle Testing (MMT) Assessment B LE MMT not formally assessed d/t pain. Grossly observed B LE WFL  -[facility] Name 11/14/23 0921          Balance    Balance Assessment sitting static balance;sitting dynamic balance;sit to stand dynamic balance;standing static balance;standing dynamic balance  -     Static Sitting Balance independent  -     Dynamic Sitting Balance standby assist  -     Position, Sitting Balance unsupported;sitting edge of bed  -     Sit to Stand Dynamic Balance contact guard;verbal cues  -     Static Standing Balance standby assist  -     Dynamic Standing Balance contact guard;verbal cues  -     Position/Device Used, Standing Balance supported;walker, front-wheeled  -[facility] Name 11/14/23 0921          Sensory Assessment (Somatosensory)    Sensory Assessment (Somatosensory) LE sensation intact  -               User Key  (r) = Recorded By, (t) = Taken By, (c) = Cosigned By      Initials Name Provider Type     Lorena Chapman PT Physical Therapist                   Goals/Plan       [facility] Name 11/14/23 0925          Bed Mobility Goal 1 (PT)    Activity/Assistive Device (Bed Mobility Goal 1, PT) sit  to supine/supine to sit  -     [place] Level/Cues Needed (Bed Mobility Goal 1, PT) independent  -     Time Frame (Bed Mobility Goal 1, PT) long term goal (LTG);10 days  -       Row Name 11/14/23 0925          Transfer Goal 1 (PT)    Activity/Assistive Device (Transfer Goal 1, PT) sit-to-stand/stand-to-sit;bed-to-chair/chair-to-bed  -     [place] Level/Cues Needed (Transfer Goal 1, PT) modified independence  -LH     Time Frame (Transfer Goal 1, PT) long term goal (LTG);10 days  -       Row Name 11/14/23 0925          Gait Training Goal 1 (PT)    Activity/Assistive Device (Gait Training Goal 1, PT) gait (walking locomotion);assistive device use  -     [place] Level (Gait Training Goal 1, PT) modified independence  -     Distance (Gait Training Goal 1, PT) 350  -LH     Time Frame (Gait Training Goal 1, PT) long term goal (LTG);10 days  -[facility] Name 11/14/23 0925          Therapy Assessment/Plan (PT)    Planned Therapy Interventions (PT) balance training;bed mobility training;gait training;home exercise program;neuromuscular re-education;patient/family education;postural re-education;ROM (range of motion);strengthening;stretching;transfer training  -               User Key  (r) = Recorded By, (t) = Taken By, (c) = Cosigned By      Initials Name Provider Type     Lorena Chapman, PT Physical Therapist                   Clinical Impression       Row Name 11/14/23 0922          Pain    Pretreatment Pain Rating 5/10  -     Posttreatment Pain Rating 5/10  -     Pain Location incisional  -     Pain Location - abdomen  -     Pre/Posttreatment Pain Comment premedicated. Pt reports pain increased to 7/10 during ambulation  -     Pain Intervention(s) Medication (See MAR);Repositioned;Ambulation/increased activity  -       Row Name 11/14/23 0922          Plan of Care Review    Plan of Care Reviewed With patient;daughter  -     Outcome Evaluation PT eval completed. Pt presents  below baseline function d/t abd pain, generalized weakness, and decreased activity tolerance. Pt required Khloe for bed mobility and CGA for STS and to ambulate 90 ft w/ FWW. Pt would benefit from skilled IP PT. Recommend home w/ assist and HHPT at d/c.  -       Row Name 11/14/23 0922          Therapy Assessment/Plan (PT)    Patient/Family Therapy Goals Statement (PT) to go home  -     Rehab Potential (PT) good, to achieve stated therapy goals  -     Criteria for Skilled Interventions Met (PT) yes;meets criteria;skilled treatment is necessary  [facility]     Therapy Frequency (PT) daily  -       Row Name 11/14/23 0922          Vital Signs    Pre Systolic BP Rehab 104  -     Pre Treatment Diastolic BP 62  -     Post Systolic BP Rehab 103  -LH     Post Treatment Diastolic BP 66  -     Pretreatment Heart Rate (beats/min) 77  -     Posttreatment Heart Rate (beats/min) 64  -LH     Pre SpO2 (%) 94  -LH     O2 Delivery Pre Treatment room air  -     O2 Delivery Intra Treatment room air  -     Post SpO2 (%) 96  -     O2 Delivery Post Treatment room air  -     Pre Patient Position Supine  -     Intra Patient Position Standing  -     Post Patient Position Sitting  -       Row Name 11/14/23 0922          Positioning and Restraints    Pre-Treatment Position in bed  -     Post Treatment Position chair  -     In Chair reclined;sitting;call light within reach;encouraged to call for assist;with family/caregiver;waffle cushion;notified [facility]  -               User Key  (r) = Recorded By, (t) = Taken By, (c) = Cosigned By      Initials Name Provider Type     Lorena Chapman, PT Physical Therapist                   Outcome Measures       Row Name 11/14/23 0926          How much help from another person do you currently need...    Turning from your back to your side while in flat bed without using bedrails? 3  -LH     Moving from lying on back to sitting on the side of a flat bed without bedrails? 3  -      Moving to and from a bed to a chair (including a wheelchair)? 3  -LH     Standing up from a chair using your arms (e.g., wheelchair, bedside chair)? 3  -LH     Climbing 3-5 steps with a railing? 3  -LH     To walk in hospital room? 3  -     AM-PAC 6 Clicks Score (PT) 18  -     Highest Level of Mobility Goal 6 --> Walk 10 steps or more  -       Row Name 11/14/23 0926          Functional Assessment    Outcome Measure Options AM-PAC 6 Clicks Basic Mobility (PT)  -               User Key  (r) = Recorded By, (t) = Taken By, (c) = Cosigned By      Initials Name Provider Type     Lorena Chapman, GERRY Physical Therapist                                 Physical Therapy Education       Title: PT OT SLP Therapies (In Progress)       Topic: Physical Therapy (In Progress)       Point: Mobility training (Done)       Learning Progress Summary             Patient Acceptance, E, VU,NR by  at 11/14/2023 0926   Family Acceptance, E, VU,NR by  at 11/14/2023 0926                         Point: Home exercise program (Not Started)       Learner Progress:  Not documented in this visit.              Point: Body mechanics (Done)       Learning Progress Summary             Patient Acceptance, E, VU,NR by  at 11/14/2023 0926   Family Acceptance, E, VU,NR by  at 11/14/2023 0926                         Point: Precautions (Done)       Learning Progress Summary             Patient Acceptance, E, VU,NR by  at 11/14/2023 0926   Family Acceptance, E, VU,NR by  at 11/14/2023 0926                                         User Key       Initials Effective Dates Name Provider Type Discipline     09/21/23 -  Lorena Chapman, GERRY Physical Therapist PT                  PT Recommendation and Plan  Planned Therapy Interventions (PT): balance training, bed mobility training, gait training, home exercise program, neuromuscular re-education, patient/family education, postural re-education, ROM (range of motion), strengthening, stretching,  transfer training  Plan of Care Reviewed With: patient, daughter  Outcome Evaluation: PT eval completed. Pt presents below baseline function d/t abd pain, generalized weakness, and decreased activity tolerance. Pt required Khloe for bed mobility and CGA for STS and to ambulate 90 ft w/ FWW. Pt would benefit from skilled IP PT. Recommend home w/ assist and HHPT at d/c.     Time Calculation:   PT Evaluation Complexity  History, PT Evaluation Complexity: 1-2 personal factors and/or comorbidities  Examination of Body Systems (PT Eval Complexity): total of 4 or more elements  Clinical Presentation (PT Evaluation Complexity): stable  Clinical Decision Making (PT Evaluation Complexity): low complexity  Overall Complexity (PT Evaluation Complexity): low complexity     PT Charges       Row Name 11/14/23 0929             Time Calculation    Start Time 0840  -      PT Received On 11/14/23  -      PT Goal Re-Cert Due Date 11/24/23  -         Timed Charges    19356 - Gait Training Minutes  10  -LH         Untimed Charges    PT Eval/Re-eval Minutes 46  -LH         Total Minutes    Timed Charges Total Minutes 10  -LH      Untimed Charges Total Minutes 46  -LH       Total Minutes 56  -LH                User Key  (r) = Recorded By, (t) = Taken By, (c) = Cosigned By      Initials Name Provider Type     Lorena Chapman, PT Physical Therapist                  Therapy Charges for Today       Code Description Service Date Service Provider Modifiers Qty    87212295012 HC GAIT TRAINING EA 15 MIN 11/14/2023 Lorena Chapman, PT GP 1    [id number] HC PT EVAL LOW COMPLEXITY 4 11/14/2023 Lorena Chapman, PT GP 1            PT G-Codes  Outcome Measure Options: AM-PAC 6 Clicks Basic Mobility (PT)  AM-PAC 6 Clicks Score (PT): 18  PT Discharge Summary  Anticipated Discharge Disposition (PT): home with assist, home with home health    Lorena Chapman PT  11/14/2023

## 2023-11-14 NOTE — PLAN OF CARE
Goal Outcome Evaluation:  Plan of Care Reviewed With: patient, daughter           Outcome Evaluation: PT eval completed. Pt presents below baseline function d/t abd pain, generalized weakness, and decreased activity tolerance. Pt required Khloe for bed mobility and CGA for STS and to ambulate 90 ft w/ FWW. Pt would benefit from skilled IP PT. Recommend home w/ assist and HHPT at d/c.      Anticipated Discharge Disposition (PT): home with assist, home with home health

## 2023-11-14 NOTE — DISCHARGE PLACEMENT REQUEST
"Nba Cervantes (63 y.o. Female)   [facility]  From [place]        Date of Birth   1960    Social Security Number       Address   [address] [facility] [address]    Home Phone   [phone]    MRN   [id number]       [facility]    Marital Status   Significant Other                            Admission Date   11/13/23    Admission Type   Elective    Admitting Provider   Chago Castillo MD    Attending Provider   Chago Castillo MD    Department, Room/Bed   [address], S587/1       Discharge Date       Discharge Disposition       Discharge Destination                                 Attending Provider: Chago Castillo MD    Allergies: Penicillins, Vancomycin, Codeine, Sulfa Antibiotics    Isolation: None   Infection: None   Code Status: CPR    Ht: 162.6 cm (64\")   Wt: 70.4 kg (155 lb 3.3 oz)    Admission Cmt: None   Principal Problem: Diverticulitis large intestine [K57.32]                   Active Insurance as of 11/13/2023       Primary Coverage       Payor Plan Insurance Group Employer/Plan Group    [facility] MEDICARE REPLACEMENT [facility] DUAL COMPLETE MEDICARE REPLACEMENT [id number]       Payor Plan Address Payor Plan Phone Number Payor Plan Fax Number Effective Dates    [address] [phone]  1/1/2023 - None Entered    [facility] [address]         Subscriber Name Subscriber Birth Date Member ID       NBA CERVANTES 1960 [id number]               Secondary Coverage       Payor Plan Insurance Group Employer/Plan Group    [place]        Payor Plan Address Payor Plan Phone Number Payor Plan Fax Number Effective Dates    [address] [phone]  12/28/2017 - None Entered    [facility] [id number]         Subscriber Name Subscriber Birth Date Member ID       NBA CERVANTES 1960 [id number]                     Emergency Contacts        (Rel.) Home Phone Work Phone Mobile Phone    JUAN SCRUGGS " (Daughter) [phone] -- --              Insurance Information                  [facility] MEDICARE REPLACEMENT/[facility] DUAL COMPLETE MEDICARE REPLACEMENT Phone: [phone]    Subscriber: Shobha Parada Subscriber#: [id number]    Group#: [id number] Precert#: --        [place] Phone: [phone]    Subscriber: Shobha Parada Subscriber#: [id number]    Group#: -- Precert#: --             History & Physical        Chago Castillo MD at 11/13/23 [facility]          [facility]    Full history and physical note from office is attached.    VS: /82  HR 70  RR 16  T 98.2  Sat 96%RA    Immunizations:  Influenza:  No  Pneumococcal:  No  Tetanus:  No  Covid : No    LAB Results:  Lab Results   Component Value Date    WBC 5.16 11/06/2023    HGB 14.0 11/06/2023    HCT 42.9 11/06/2023    MCV 90.1 11/06/2023     11/06/2023    GLUCOSE 93 11/06/2023    BUN 14 11/06/2023    CREATININE 0.83 11/06/2023     11/06/2023    K 3.7 11/06/2023     11/06/2023    CO2 32.0 (H) 11/06/2023    CALCIUM 8.9 11/06/2023    ALBUMIN 4.3 11/06/2023    AST 27 11/06/2023    ALT 31 11/06/2023    BILITOT 0.5 11/06/2023       Cancer Staging (if applicable)  Cancer Patient: __ yes _z_no __unknown__N/A; If yes, clinical stage T:__ N:__M:__, stage group or __N/A      Impression: diverticulitis of large intestine       Plan: LOW ANTERIOR RESECTION; APPENDECTOMY       MICKIE Powell   11/13/2023   13:52 EST    Electronically signed by Chago Castillo MD at 11/13/23 1417   Source Note        [Media Unavailable] Scan on 11/10/2023 0746 by [employer] Onbase, [place]: H&P DIVERTICULITIS AZUCENA, CSGA, 10/19/2023          Electronically signed by [employer], [place] at 11/10/23 1027                 H&P signed by [employer], [place] at 11/10/23 1027         [Media Unavailable] Scan on 11/10/2023 0746 by [employer], [place]: H&P DIVERTICULITIS GABI ZENG, 10/19/2023          Electronically  signed by [employer], [place] at 11/10/23 1027       Physician Progress Notes (most recent note)    No notes of this type exist for this encounter.          Physical Therapy Notes (most recent note)        Lorena Chapman, PT at 23 0840  Version 1 of 1         Patient Name: Shobha Pardaa  : 1960    MRN: [id number]                              Today's Date: 2023       Admit Date: 2023    Visit Dx:     ICD-10-CM ICD-9-CM   1. Diverticulitis  K57.92 562.11     Patient Active Problem List   Diagnosis    Gastroparesis    Esophageal dysphagia    Gastroesophageal reflux disease    Diverticulitis    Left lower quadrant abdominal pain     Past Medical History:   Diagnosis Date    Perez esophagus     Cholelithiasis     Colon polyp     Diverticulitis of colon     Elevated cholesterol     GERD (gastroesophageal reflux disease)     Hyperlipidemia     Irritable bowel syndrome     Kidney stones     Palpitations      Past Surgical History:   Procedure Laterality Date    ABDOMINAL HYSTERECTOMY      with unilateral oopherectomy    ANTERIOR CERVICAL DISCECTOMY W/ FUSION      ANTERIOR CERVICAL DISCECTOMY W/ FUSION      ANTERIOR CERVICAL DISCECTOMY W/ FUSION  2019    BREAST EXCISIONAL BIOPSY Left     x 4    CERVIX REMOVAL      open with oopherectomy    CHOLECYSTECTOMY      COLONOSCOPY      EXPLORATORY LAPAROTOMY        General Information       Row Name 23 0915          Physical Therapy Time and Intention    Document Type evaluation  -     Mode of Treatment physical therapy  -       Row Name 2315          General Information    Patient Profile Reviewed yes  -     Prior Level of Function independent:;all household mobility;community mobility;gait;ADL's;grooming;dressing;bathing;driving;shopping  No AD use at baseline  -     Existing Precautions/Restrictions other (see comments)  abd wound  -     Barriers to Rehab none identified  -       Row Name 23 0970           Living Environment    People in Home alone  Dtr will be staying with her for a few weeks after d/c  -[facility] Name 11/14/23 0915          Home Main Entrance    Number of Stairs, Main Entrance none  -[facility] Name 11/14/23 0915          Stairs Within Home, Primary    Number of Stairs, Within Home, Primary none  -[facility] Name 11/14/23 0915          Cognition    Orientation Status (Cognition) oriented x 4  -[facility] Name 11/14/23 0915          Safety Issues, Functional Mobility    Safety Issues Affecting Function (Mobility) insight into deficits/self-awareness;safety precaution awareness;sequencing abilities  -     Impairments Affecting Function (Mobility) balance;endurance/activity tolerance;pain;strength;shortness of breath  -               User Key  (r) = Recorded By, (t) = Taken By, (c) = Cosigned By      Initials Name Provider Type     Lorena Chapman, PT Physical Therapist                   Mobility       [facility] Name 11/14/23 0917          Bed Mobility    Bed Mobility supine-sit  -     Supine-Sit [place] (Bed Mobility) minimum assist (75% patient effort);verbal cues  -     Assistive Device (Bed Mobility) bed rails;head of bed elevated  -     Comment, (Bed Mobility) VCs for hand placement and sequencing. Educated on log roll technique to decrease abd pain. Requires increased time and effort d/t pain  -[facility] Name 11/14/23 0917          Transfers    Comment, (Transfers) VCs for hand placement and sequencing. Requires increased time and effort  -LH       Row Name 11/14/23 0917          Sit-Stand Transfer    Sit-Stand [place] (Transfers) contact guard;verbal cues  -     Assistive Device (Sit-Stand Transfers) walker, front-wheeled  -     Comment, (Sit-Stand Transfer) STS x1 from EOB w/o FWW, x1 from toilet w/ FWW  -[facility] Name 11/14/23 0917          Gait/Stairs (Locomotion)    [place] Level (Gait) contact guard;verbal cues  -     Assistive Device (Gait)  walker, front-wheeled  -     Distance in Feet (Gait) 90  -     Deviations/Abnormal Patterns (Gait) bilateral deviations;base of support, narrow;teressa decreased;gait speed decreased;stride length decreased  -     Bilateral Gait Deviations forward flexed posture;heel strike decreased  -     Comment, (Gait/Stairs) Pt demo step through gait pattern w/ decreased gait speed. VCs for sequencing w/ FWW. Cues for upright posture and to stay close to walker. Pt c/o increased nausea during ambulation. Distance limited by pain and fatigue  -               User Key  (r) = Recorded By, (t) = Taken By, (c) = Cosigned By      Initials Name Provider Type     Lorena Chapman PT Physical Therapist                   Obj/Interventions       [facility] Name 11/14/23 0921          Range of Motion Comprehensive    General Range of Motion bilateral lower extremity ROM WFL  -[facility] Name 11/14/23 0921          Strength Comprehensive (MMT)    General Manual Muscle Testing (MMT) Assessment lower extremity strength deficits identified  -     Comment, General Manual Muscle Testing (MMT) Assessment B LE MMT not formally assessed d/t pain. Grossly observed B LE WFL  -[facility] Name 11/14/23 0921          Balance    Balance Assessment sitting static balance;sitting dynamic balance;sit to stand dynamic balance;standing static balance;standing dynamic balance  -     Static Sitting Balance independent  -     Dynamic Sitting Balance standby assist  -     Position, Sitting Balance unsupported;sitting edge of bed  -     Sit to Stand Dynamic Balance contact guard;verbal cues  -     Static Standing Balance standby assist  -     Dynamic Standing Balance contact guard;verbal cues  -     Position/Device Used, Standing Balance supported;walker, front-wheeled  -[facility] Name 11/14/23 0921          Sensory Assessment (Somatosensory)    Sensory Assessment (Somatosensory) LE sensation intact  -               User Key  (r) =  Recorded By, (t) = Taken By, (c) = Cosigned By      Initials Name Provider Type     Lorena Chapman, PT Physical Therapist                   Goals/Plan       Row Name 11/14/23 0925          Bed Mobility Goal 1 (PT)    Activity/Assistive Device (Bed Mobility Goal 1, PT) sit to supine/supine to sit  -     [place] Level/Cues Needed (Bed Mobility Goal 1, PT) independent  -LH     Time Frame (Bed Mobility Goal 1, PT) long term goal (LTG);10 days  -[facility] Name 11/14/23 0925          Transfer Goal 1 (PT)    Activity/Assistive Device (Transfer Goal 1, PT) sit-to-stand/stand-to-sit;bed-to-chair/chair-to-bed  -LH     [place] Level/Cues Needed (Transfer Goal 1, PT) modified independence  -LH     Time Frame (Transfer Goal 1, PT) long term goal (LTG);10 days  -[facility] Name 11/14/23 0925          Gait Training Goal 1 (PT)    Activity/Assistive Device (Gait Training Goal 1, PT) gait (walking locomotion);assistive device use  -     [place] Level (Gait Training Goal 1, PT) modified independence  -     Distance (Gait Training Goal 1, PT) 350  -LH     Time Frame (Gait Training Goal 1, PT) long term goal (LTG);10 days  -[facility] Name 11/14/23 0925          Therapy Assessment/Plan (PT)    Planned Therapy Interventions (PT) balance training;bed mobility training;gait training;home exercise program;neuromuscular re-education;patient/family education;postural re-education;ROM (range of motion);strengthening;stretching;transfer training  -               User Key  (r) = Recorded By, (t) = Taken By, (c) = Cosigned By      Initials Name Provider Type     Lorena Chapman, PT Physical Therapist                   Clinical Impression       Row Name 11/14/23 0922          Pain    Pretreatment Pain Rating 5/10  -LH     Posttreatment Pain Rating 5/10  -LH     Pain Location incisional  -     Pain Location - abdomen  -LH     Pre/Posttreatment Pain Comment premedicated. Pt reports pain increased to 7/10 during  ambulation  -     Pain Intervention(s) Medication (See MAR);Repositioned;Ambulation/increased activity  -[facility] Name 11/14/23 0922          Plan of Care Review    Plan of Care Reviewed With patient;daughter  -     Outcome Evaluation PT eval completed. Pt presents below baseline function d/t abd pain, generalized weakness, and decreased activity tolerance. Pt required Khloe for bed mobility and CGA for STS and to ambulate 90 ft w/ FWW. Pt would benefit from skilled IP PT. Recommend home w/ assist and HHPT at d/c.  -[facility] Name 11/14/23 0922          Therapy Assessment/Plan (PT)    Patient/Family Therapy Goals Statement (PT) to go home  -     Rehab Potential (PT) good, to achieve stated therapy goals  -     Criteria for Skilled Interventions Met (PT) yes;meets criteria;skilled treatment is necessary  -     Therapy Frequency (PT) daily  -[facility] Name 11/14/23 0922          Vital Signs    Pre Systolic BP Rehab 104  -     Pre Treatment Diastolic BP 62  -     Post Systolic BP Rehab 103  -     Post Treatment Diastolic BP 66  -     Pretreatment Heart Rate (beats/min) 77  -     Posttreatment Heart Rate (beats/min) 64  -     Pre SpO2 (%) 94  -     O2 Delivery Pre Treatment room air  -     O2 Delivery Intra Treatment room air  -     Post SpO2 (%) 96  -     O2 Delivery Post Treatment room air  -     Pre Patient Position Supine  -     Intra Patient Position Standing  -     Post Patient Position Sitting  -[facility] Name 11/14/23 0922          Positioning and Restraints    Pre-Treatment Position in bed  -     Post Treatment Position chair  -     In Chair reclined;sitting;call light within reach;encouraged to call for assist;with family/caregiver;waffle cushion;notified [facility]               User Key  (r) = Recorded By, (t) = Taken By, (c) = Cosigned By      Initials Name Provider Type     Lorena Chapman, PT Physical Therapist                   Outcome Measures        Row Name 11/14/23 0926          How much help from another person do you currently need...    Turning from your back to your side while in flat bed without using bedrails? 3  -LH     Moving from lying on back to sitting on the side of a flat bed without bedrails? 3  -LH     Moving to and from a bed to a chair (including a wheelchair)? 3  -LH     Standing up from a chair using your arms (e.g., wheelchair, bedside chair)? 3  -LH     Climbing 3-5 steps with a railing? 3  -LH     To walk in hospital room? 3  -     AM-PAC 6 Clicks Score (PT) 18  -     Highest Level of Mobility Goal 6 --> Walk 10 steps or more  -       Row Name 11/14/23 0926          Functional Assessment    Outcome Measure Options AM-PAC 6 Clicks Basic Mobility (PT)  -               User Key  (r) = Recorded By, (t) = Taken By, (c) = Cosigned By      Initials Name Provider Type     Lorena Chapman PT Physical Therapist                                 Physical Therapy Education       Title: PT OT SLP Therapies (In Progress)       Topic: Physical Therapy (In Progress)       Point: Mobility training (Done)       Learning Progress Summary             Patient Acceptance, E, VU,NR by  at 11/14/2023 0926   Family Acceptance, E, VU,NR by  at 11/14/2023 0926                         Point: Home exercise program (Not Started)       Learner Progress:  Not documented in this visit.              Point: Body mechanics (Done)       Learning Progress Summary             Patient Acceptance, E, VU,NR by  at 11/14/2023 0926   Family Acceptance, E, VU,NR by  at 11/14/2023 0926                         Point: Precautions (Done)       Learning Progress Summary             Patient Acceptance, E, VU,NR by  at 11/14/2023 0926   Family Acceptance, E, VU,NR by  at 11/14/2023 0926                                         User Key       Initials Effective Dates Name Provider Type [facility] 09/21/23 -  Lorena Chapman, GERRY Physical Therapist PT                   PT Recommendation and Plan  Planned Therapy Interventions (PT): balance training, bed mobility training, gait training, home exercise program, neuromuscular re-education, patient/family education, postural re-education, ROM (range of motion), strengthening, stretching, transfer training  Plan of Care Reviewed With: patient, daughter  Outcome Evaluation: PT eval completed. Pt presents below baseline function d/t abd pain, generalized weakness, and decreased activity tolerance. Pt required Khloe for bed mobility and CGA for STS and to ambulate 90 ft w/ FWW. Pt would benefit from skilled IP PT. Recommend home w/ assist and HHPT at d/c.     Time Calculation:   PT Evaluation Complexity  History, PT Evaluation Complexity: 1-2 personal factors and/or comorbidities  Examination of Body Systems (PT Eval Complexity): total of 4 or more elements  Clinical Presentation (PT Evaluation Complexity): stable  Clinical Decision Making (PT Evaluation Complexity): low complexity  Overall Complexity (PT Evaluation Complexity): low complexity     PT Charges       Row Name 11/14/23 0929             Time Calculation    Start Time 0840  -      PT Received On 11/14/23  -      PT Goal Re-Cert Due Date 11/24/23  -         Timed Charges    73432 - Gait Training Minutes  10  -LH         Untimed Charges    PT Eval/Re-eval Minutes 46  -LH         Total Minutes    Timed Charges Total Minutes 10  -LH      Untimed Charges Total Minutes 46  -LH       Total Minutes 56  -LH                User Key  (r) = Recorded By, (t) = Taken By, (c) = Cosigned By      Initials Name Provider Type     Lroena Chapman, PT Physical Therapist                  Therapy Charges for Today       Code Description Service Date Service Provider Modifiers Qty    24705865344 HC GAIT TRAINING EA 15 MIN 11/14/2023 Lorena Chapman, PT GP 1    33450590952 HC PT EVAL LOW COMPLEXITY 4 11/14/2023 Lorena Chapman, PT GP 1            PT G-Codes  Outcome Measure Options: AM-PAC 6  Clicks Basic Mobility (PT)  AM-PAC 6 Clicks Score (PT): 18  PT Discharge Summary  Anticipated Discharge Disposition (PT): home with assist, home with home health    Lorena Chapman, PT  11/14/2023      Electronically signed by Lorena Chapman, PT at 11/14/23 0971       Occupational Therapy Notes (most recent note)    No notes exist for this encounter.

## 2023-11-14 NOTE — PLAN OF CARE
Problem: Adult Inpatient Plan of Care  Goal: Plan of Care Review  Outcome: Ongoing, Progressing  Goal: Patient-Specific Goal (Individualized)  Outcome: Ongoing, Progressing  Goal: Absence of Hospital-Acquired Illness or Injury  Outcome: Ongoing, Progressing  Intervention: Identify and Manage Fall Risk  Recent Flowsheet Documentation  Taken 11/14/2023 1600 by Margarita Abreu RN  Safety Promotion/Fall Prevention:   activity supervised   assistive device/personal items within reach   clutter free environment maintained   fall prevention program maintained   nonskid shoes/slippers when out of bed   safety round/check completed   toileting scheduled  Taken 11/14/2023 1200 by Margarita Abreu RN  Safety Promotion/Fall Prevention:   activity supervised   assistive device/personal items within reach   clutter free environment maintained   fall prevention program maintained   nonskid shoes/slippers when out of bed   safety round/check completed   toileting scheduled  Taken 11/14/2023 0800 by Margarita Abreu RN  Safety Promotion/Fall Prevention:   activity supervised   assistive device/personal items within reach   clutter free environment maintained   fall prevention program maintained   nonskid shoes/slippers when out of bed   safety round/check completed   toileting scheduled  Intervention: Prevent Skin Injury  Recent Flowsheet Documentation  Taken 11/14/2023 1600 by Margarita Abreu RN  Body Position: position changed independently  Taken 11/14/2023 1200 by Margarita Abreu RN  Body Position: position changed independently  Skin Protection: adhesive use limited  Taken 11/14/2023 0800 by Margarita Abreu RN  Body Position: position changed independently  Skin Protection: adhesive use limited  Intervention: Prevent and Manage VTE (Venous Thromboembolism) Risk  Recent Flowsheet Documentation  Taken 11/14/2023 1600 by Margarita Abreu RN  Activity Management: activity encouraged  Taken 11/14/2023 1200 by Margarita Abreu  RN  Activity Management:   activity encouraged   up ad jamarcus   up in chair  Taken 11/14/2023 0800 by Margarita Abreu RN  Activity Management: activity encouraged  Intervention: Prevent Infection  Recent Flowsheet Documentation  Taken 11/14/2023 1600 by Margarita Abreu RN  Infection Prevention: environmental surveillance performed  Taken 11/14/2023 1200 by Margarita Abreu RN  Infection Prevention: environmental surveillance performed  Taken 11/14/2023 0800 by Margarita Abreu RN  Infection Prevention: environmental surveillance performed  Goal: Optimal Comfort and Wellbeing  Outcome: Ongoing, Progressing  Intervention: Provide Person-Centered Care  Recent Flowsheet Documentation  Taken 11/14/2023 0800 by Margarita Abreu RN  Trust Relationship/Rapport:   care explained   choices provided   thoughts/feelings acknowledged  Goal: Readiness for Transition of Care  Outcome: Ongoing, Progressing  Intervention: Mutually Develop Transition Plan  Recent Flowsheet Documentation  Taken 11/14/2023 1809 by Margarita Abreu RN  Transportation Anticipated: family or friend will provide  Patient/Family Anticipated Services at Transition: none  Patient/Family Anticipates Transition to: home with family   Goal Outcome Evaluation:

## 2023-11-14 NOTE — CASE MANAGEMENT/SOCIAL WORK
Discharge Planning Assessment  [facility]     Patient Name: Shobha Parada  MRN: [id number]  Today's Date: 11/14/2023    Admit Date: 11/13/2023    Plan: home   Discharge Needs Assessment       Row Name 11/14/23 0858       Living Environment    People in Home alone    Unique Family Situation 1 level    Current Living Arrangements home    Primary Care Provided by self       Transition Planning    Patient/Family Anticipates Transition to home with family       Discharge Needs Assessment    Readmission Within the Last 30 Days no previous admission in last 30 days    Equipment Currently Used at Home none    Concerns to be Addressed discharge planning;basic needs                   Discharge Plan       Row Name 11/14/23 0858       Plan    Plan home    Patient/Family in Agreement with Plan yes    Plan Comments I met with this patient and her daughter at the bedside. She lives alone in a single floor home in [facility]. Her daughter will be living with her for awhile after her discharge. She is independent with activities of daily living and mobility. PT has been consulted. She anticipates returning home after this hospitalization and her daughter can transport. Case management will continue to follow her plan of care and assist with any discharge planning needs.    Final Discharge Disposition Code 01 - home or self-care                  Continued Care and Services - Admitted Since 11/13/2023    Coordination has not been started for this encounter.       Expected Discharge Date and Time       Expected Discharge Date Expected Discharge Time    Nov 21, 2023            Demographic Summary       Row Name 11/14/23 0857       General Information    General Information Comments I confirmed that Joelle Damico is Ms Parada's PCP and she has [employer] Medicare and [place] Medicaid insurance                   Functional Status       Row Name 11/14/23 0858       Functional Status, IADL    Medications independent    Meal  Preparation independent    Housekeeping independent    Laundry independent    Shopping independent                   Psychosocial    No documentation.                  Abuse/Neglect    No documentation.                  Legal    No documentation.                  Substance Abuse    No documentation.                  Patient Forms    No documentation.                     Zena Hannah RN

## 2023-11-14 NOTE — CASE MANAGEMENT/SOCIAL WORK
Continued Stay Note   [place]     Patient Name: Shobha Parada  MRN: [id number]  Today's Date: 11/14/2023    Admit Date: 11/13/2023    Plan: home with home health   Discharge Plan       Row Name 11/14/23 1257       Plan    Plan home with home health    Patient/Family in Agreement with Plan yes    Plan Comments I spoke with the patient and her daughter regarding PTs recommendation for her to have home health. She is agreeable, and asked that I make a referral to [facility] for SN and PT, which I did and she was approved. She also asked me to make a [facility] walk[facility], which I did. Both orders are in EPIC. CM will follow.    Final Discharge Disposition Code 06 - home with home health care                   Discharge Codes    No documentation.                 Expected Discharge Date and Time       Expected Discharge Date Expected Discharge Time    Nov 21, 2023               Zena Hannah RN

## 2023-11-15 LAB
ANION GAP SERPL CALCULATED.3IONS-SCNC: 8 MMOL/L (ref 5–15)
BUN SERPL-MCNC: 14 MG/DL (ref 8–23)
BUN/CREAT SERPL: 16.1 (ref 7–25)
CALCIUM SPEC-SCNC: 9.4 MG/DL (ref 8.6–10.5)
CHLORIDE SERPL-SCNC: 93 MMOL/L (ref 98–107)
CO2 SERPL-SCNC: 35 MMOL/L (ref 22–29)
CREAT SERPL-MCNC: 0.87 MG/DL (ref 0.57–1)
CYTO UR: NORMAL
DEPRECATED RDW RBC AUTO: 42.6 FL (ref 37–54)
EGFRCR SERPLBLD CKD-EPI 2021: 74.5 ML/MIN/1.73
ERYTHROCYTE [DISTWIDTH] IN BLOOD BY AUTOMATED COUNT: 13 % (ref 12.3–15.4)
GLUCOSE SERPL-MCNC: 80 MG/DL (ref 65–99)
HCT VFR BLD AUTO: 38.3 % (ref 34–46.6)
HGB BLD-MCNC: 12.4 G/DL (ref 12–15.9)
LAB AP CASE REPORT: NORMAL
LAB AP CLINICAL INFORMATION: NORMAL
MAGNESIUM SERPL-MCNC: 1.8 MG/DL (ref 1.6–2.4)
MCH RBC QN AUTO: 29.1 PG (ref 26.6–33)
MCHC RBC AUTO-ENTMCNC: 32.4 G/DL (ref 31.5–35.7)
MCV RBC AUTO: 89.9 FL (ref 79–97)
PATH REPORT.FINAL DX SPEC: NORMAL
PATH REPORT.GROSS SPEC: NORMAL
PLATELET # BLD AUTO: 249 10*3/MM3 (ref 140–450)
PMV BLD AUTO: 8.5 FL (ref 6–12)
POTASSIUM SERPL-SCNC: 3 MMOL/L (ref 3.5–5.2)
RBC # BLD AUTO: 4.26 10*6/MM3 (ref 3.77–5.28)
SODIUM SERPL-SCNC: 136 MMOL/L (ref 136–145)
WBC NRBC COR # BLD: 9.79 10*3/MM3 (ref 3.4–10.8)

## 2023-11-15 PROCEDURE — 25010000002 ENOXAPARIN PER 10 MG: Performed by: COLON & RECTAL SURGERY

## 2023-11-15 PROCEDURE — 94761 N-INVAS EAR/PLS OXIMETRY MLT: CPT

## 2023-11-15 PROCEDURE — 99232 SBSQ HOSP IP/OBS MODERATE 35: CPT | Performed by: NURSE PRACTITIONER

## 2023-11-15 PROCEDURE — 85027 COMPLETE CBC AUTOMATED: CPT | Performed by: NURSE PRACTITIONER

## 2023-11-15 PROCEDURE — 25010000002 ONDANSETRON PER 1 MG: Performed by: COLON & RECTAL SURGERY

## 2023-11-15 PROCEDURE — 25010000002 HYDROMORPHONE PER 4 MG: Performed by: COLON & RECTAL SURGERY

## 2023-11-15 PROCEDURE — 83735 ASSAY OF MAGNESIUM: CPT | Performed by: NURSE PRACTITIONER

## 2023-11-15 PROCEDURE — 80048 BASIC METABOLIC PNL TOTAL CA: CPT | Performed by: NURSE PRACTITIONER

## 2023-11-15 RX ORDER — SIMETHICONE 80 MG
80 TABLET,CHEWABLE ORAL
Status: DISCONTINUED | OUTPATIENT
Start: 2023-11-15 | End: 2023-11-17 | Stop reason: HOSPADM

## 2023-11-15 RX ORDER — POTASSIUM CHLORIDE 750 MG/1
40 CAPSULE, EXTENDED RELEASE ORAL EVERY 4 HOURS
Status: DISPENSED | OUTPATIENT
Start: 2023-11-15 | End: 2023-11-15

## 2023-11-15 RX ADMIN — ENOXAPARIN SODIUM 40 MG: 100 INJECTION SUBCUTANEOUS at 09:00

## 2023-11-15 RX ADMIN — ACETAMINOPHEN 1000 MG: 500 TABLET ORAL at 05:24

## 2023-11-15 RX ADMIN — DIAZEPAM 5 MG: 5 TABLET ORAL at 11:04

## 2023-11-15 RX ADMIN — OXYCODONE HYDROCHLORIDE 5 MG: 5 TABLET ORAL at 12:47

## 2023-11-15 RX ADMIN — HYDROMORPHONE HYDROCHLORIDE 0.5 MG: 2 INJECTION, SOLUTION INTRAMUSCULAR; INTRAVENOUS; SUBCUTANEOUS at 20:29

## 2023-11-15 RX ADMIN — OXYCODONE HYDROCHLORIDE 5 MG: 5 TABLET ORAL at 16:07

## 2023-11-15 RX ADMIN — CARVEDILOL 6.25 MG: 6.25 TABLET, FILM COATED ORAL at 08:59

## 2023-11-15 RX ADMIN — HYDROCHLOROTHIAZIDE 25 MG: 25 TABLET ORAL at 08:59

## 2023-11-15 RX ADMIN — SIMETHICONE 80 MG: 80 TABLET, CHEWABLE ORAL at 20:29

## 2023-11-15 RX ADMIN — HYDROMORPHONE HYDROCHLORIDE 0.5 MG: 2 INJECTION, SOLUTION INTRAMUSCULAR; INTRAVENOUS; SUBCUTANEOUS at 02:35

## 2023-11-15 RX ADMIN — ONDANSETRON 4 MG: 2 INJECTION INTRAMUSCULAR; INTRAVENOUS at 12:47

## 2023-11-15 RX ADMIN — OXYCODONE HYDROCHLORIDE AND ACETAMINOPHEN 500 MG: 500 TABLET ORAL at 09:05

## 2023-11-15 RX ADMIN — ONDANSETRON 4 MG: 2 INJECTION INTRAMUSCULAR; INTRAVENOUS at 20:37

## 2023-11-15 RX ADMIN — DIAZEPAM 5 MG: 5 TABLET ORAL at 20:29

## 2023-11-15 RX ADMIN — POTASSIUM CHLORIDE 40 MEQ: 750 CAPSULE, EXTENDED RELEASE ORAL at 16:05

## 2023-11-15 RX ADMIN — PANTOPRAZOLE SODIUM 40 MG: 40 TABLET, DELAYED RELEASE ORAL at 05:24

## 2023-11-15 RX ADMIN — CETIRIZINE HYDROCHLORIDE 10 MG: 10 TABLET, FILM COATED ORAL at 08:59

## 2023-11-15 RX ADMIN — SENNOSIDES AND DOCUSATE SODIUM 2 TABLET: 8.6; 5 TABLET ORAL at 08:59

## 2023-11-15 RX ADMIN — SIMETHICONE 80 MG: 80 TABLET, CHEWABLE ORAL at 16:07

## 2023-11-15 RX ADMIN — ALVIMOPAN 12 MG: 12 CAPSULE ORAL at 08:59

## 2023-11-15 RX ADMIN — ACETAMINOPHEN 1000 MG: 500 TABLET ORAL at 12:47

## 2023-11-15 NOTE — PLAN OF CARE
Goal Outcome Evaluation:           Problem: Adult Inpatient Plan of Care  Goal: Plan of Care Review  Outcome: Ongoing, Progressing  Goal: Patient-Specific Goal (Individualized)  Outcome: Ongoing, Progressing  Goal: Absence of Hospital-Acquired Illness or Injury  Outcome: Ongoing, Progressing  Goal: Optimal Comfort and Wellbeing  Outcome: Ongoing, Progressing  Goal: Readiness for Transition of Care  Outcome: Ongoing, Progressing     Problem: Pain Acute  Goal: Acceptable Pain Control and Functional Ability  Outcome: Ongoing, Progressing     Problem: Fall Injury Risk  Goal: Absence of Fall and Fall-Related Injury  Outcome: Ongoing, Progressing

## 2023-11-15 NOTE — PROGRESS NOTES
[facility] Medicine Services  CONSULT NOTE      Patient Name: Shobha Parada  : 1960  MRN: [id number]    Primary Care Physician: Joelle Damioc MD  Provider requesting consultation: No Known Provider    Subjective   Subjective     Reason for Consultation:  Medical management    HPI:  Patient walking the hallways this morning and states she feels much better.  States she had some fluid rectal discharge today and did not note that had stool in it or not.  Still complaining surgical abdominal pain.  States she is not tolerating clear liquids well.  States that she has belching after she eats as well as all night long.      Objective   Objective     Vital Signs:   Temp:  [98.3 °F (36.8 °C)-98.4 °F (36.9 °C)] 98.4 °F (36.9 °C)  Heart Rate:  [74-83] 81  Resp:  [18] 18  BP: (101-123)/(57-86) 101/60  Flow (L/min):  [2] 2    Physical Exam  Constitutional: Alert, WD, WN female in NAD but appears in pain walking the hallways with a walker and a family member by her side  ENT: Pink, moist mucous membranes   Respiratory: Nonlabored, symmetrical chest expansion, CTAB  Cardiovascular: RRR, no M/R/G, +DP pulses bilaterally  Gastrointestinal: Soft, generalized TTP, mildly distended +BS heard on the LLQ only; abdominal dressing CDI with no erythema surrounding the dressing  Musculoskeletal: RUIZ; no LE edema bilaterally  Neurologic: Oriented x4, strength symmetric in all extremities, follows all commands, CN II-XII intact, speech clear  Skin: No rashes on exposed skin  Psychiatric: Pleasant and cooperative; normal affect    Result Review:  I have personally reviewed the results from the time of admission and agree with these findings:  []  Laboratory  []  Radiology  []  EKG/Telemetry   []  Pathology  []  Old records  []  Other:  Most notable findings include:     LAB RESULTS:                              Brief Urine Lab Results       None          Microbiology Results (last 10 days)       **  No results found for the last 240 hours. **            Peripheral Block    Result Date: 11/13/2023  Anna Galindo CRNA     11/13/2023  3:20 PM Peripheral Block Pre-sedation assessment completed: 11/13/2023 2:58 PM Patient reassessed immediately prior to procedure Patient location during procedure: OR Start time: 11/13/2023 3:00 PM Stop time: 11/13/2023 3:04 PM Reason for block: at surgeon's request and post-op pain management Performed by Anesthesiologist: Dale Reinoso MD Preanesthetic Checklist Completed: patient identified, IV checked, site marked, risks and benefits discussed, surgical consent, monitors and equipment checked, pre-op evaluation and timeout performed Prep: Pt Position: supine Sterile barriers:cap, gloves, mask and washed/disinfected hands Prep: ChloraPrep Patient monitoring: blood pressure monitoring, continuous pulse oximetry and EKG Procedure Sedation: yes Performed under: general Guidance:ultrasound guided ULTRASOUND INTERPRETATION.  Using ultrasound guidance a 20 G gauge needle was placed in close proximity to the nerve, at which point, under ultrasound guidance anesthetic was injected in the area of the nerve and spread of the anesthesia was seen on ultrasound in close proximity thereto.  There were no abnormalities seen on ultrasound; a digital image was taken; and the patient tolerated the procedure with no complications. Images:still images obtained, printed/placed on chart Laterality:Bilateral Block Type:TAP Injection Technique:single-shot Needle Type:short-bevel and echogenic Needle Gauge:20 G Resistance on Injection: none Medications Used: dexamethasone sodium phosphate injection - Injection  4 mg - 11/13/2023 3:00:00 PM bupivacaine PF (MARCAINE) 0.25 % injection - Injection  60 mL - 11/13/2023 3:00:00 PM Medications Comment:Block Injection:  LA dose divided between Right and Left block Post Assessment Injection Assessment: negative aspiration for heme, incremental injection  "and no paresthesia on injection Patient Tolerance:comfortable throughout block Complications:no Additional Notes Subcostal TAPs A high-frequency linear transducer, with sterile cover, was placed sub-xiphoid to identify Linea Alba, right and left Rectus Abdominus Muscles (MARISEL). The transducer was moved either right or left subcostally to identify the MARISEL and the Transverse Abdominus Muscle (MASON). The insertion site was prepped in sterile fashion and then localized with 2-5 ml of 1% Lidocaine. Using ultrasound-guidance, a 20-gauge B-Marin 4\" Ultraplex 360 non-stimulating echogenic needle was advanced in plane, from medial to lateral, until the tip of the needle was in the fascial plane between the MARISEL and MASON. 1-3ml of preservative free normal saline was used to hydro-dissect the fascial planes. After the fascial plane was verified, the local anesthetic (LA) was injected. The procedure was repeated on the opposite side for bilateral coverage. Aspiration every 5 ml to prevent intravascular injection. Injection was completed with negative aspiration of blood and negative intravascular injection. Injection pressures were normal with minimal resistance. The subcostal approach to the TAP nerve block ideally anesthetizes the intercostal nerves T6-T9. Mid-Axillary/Lateral TAPs A high-frequency linear transducer, with sterile cover, was placed in the midaxillary line between the ASIS and costal margin. The External Oblique Muscle (EOM), Internal Oblique Muscle (IOM), Transverse Abdominus Muscle (MASON), and Peritoneum were identified. The insertion site was prepped in sterile fashion and then localized with 2-5 ml of 1% Lidocaine. Using ultrasound-guidance, a 20-gauge B-Marin 4\" Ultraplex 360 non-stimulating echogenic needle was advanced in plane, from medial to lateral, until the tip of the needle was in the fascial plane between the IOM and MASON. 1-3ml of preservative free normal saline was used to hydro-dissect the fascial " planes. After the fascial plane was verified, the local anesthetic (LA) was injected. The procedure was repeated on the opposite side for bilateral coverage. Aspiration every 5 ml to prevent intravascular injection. Injection was completed with negative aspiration of blood and negative intravascular injection. Injection pressures were normal with minimal resistance. Midaxillary TAPs should reach intercostal nerves T10- T11 and the subcostal nerve T12.         Assessment & Plan   Assessment & Plan     Active Hospital Problems    Diagnosis  POA    Diverticulitis [K57.92]  Yes    Gastroesophageal reflux disease [K21.9]  Yes    Gastroparesis [K31.84]  Yes    Esophageal dysphagia [R13.19]  Yes      Resolved Hospital Problems    Diagnosis Date Resolved POA    **Diverticulitis large intestine [K57.32] 11/13/2023 Yes     Diverticulitis  --S/p takedown of splenic flexure, left ureterolysis, extensive pelvic enterolysis, proctoscopy and appendectomy on 11/13/2023 by Dr. Castillo  -- Pain medication per Dr. Castillo  -- Enteric with PRN meds to follow  --AM labs    GERD  --PO Protonix    Gastroparesis  Esophageal dysphagia  --Currently on clear liquids; Dr. Varghese to advance    Thank you for allowing [facility] to provide consultative care for your patient, we will continue to follow while clinically appropriate.    Esperanza Yuan, APRN  11/15/23

## 2023-11-15 NOTE — PROGRESS NOTES
[facility] Medicine Services  CONSULT NOTE      Patient Name: Shobha Parada  : 1960  MRN: [id number]    Primary Care Physician: Joelle Damico MD  Provider requesting consultation: No Known Provider    Subjective   Subjective     Reason for Consultation:  Medical management    HPI:  Shobha Parada is a 63 y.o. female with PMH significant for diverticulitis, esophageal dysphagia, GERD, gastroparesis who presented to [facility] on 2023 as a direct admission to Dr. Castillo.  She had surgery 2023 and hospital medicine was consulted to medically manage the patient.    Personal History     Past Medical History:   Diagnosis Date    Perez esophagus     Cholelithiasis     Colon polyp     Diverticulitis of colon     Elevated cholesterol     GERD (gastroesophageal reflux disease)     Hyperlipidemia     Irritable bowel syndrome     Kidney stones     Palpitations        Past Surgical History:   Procedure Laterality Date    ABDOMINAL HYSTERECTOMY      with unilateral oopherectomy    ANTERIOR CERVICAL DISCECTOMY W/ FUSION      ANTERIOR CERVICAL DISCECTOMY W/ FUSION      ANTERIOR CERVICAL DISCECTOMY W/ FUSION  2019    APPENDECTOMY N/A 2023    Procedure: APPENDECTOMY;  Surgeon: Chago Castillo MD;  Location: [facility] OR;  Service: General;  Laterality: N/A;    BREAST EXCISIONAL BIOPSY Left     x 4    CERVIX REMOVAL      open with oopherectomy    CHOLECYSTECTOMY      COLON RESECTION N/A 2023    Procedure: LOW ANTERIOR RESECTION WITH TAKEDOWN SPLENIC FLEXURE, LEFT URETEROLYSIS, EXTENSIVE PELVIC ENTEROLYSIS AND PROCTOSCOPY;  Surgeon: Chago Castillo MD;  Location: [facility] OR;  Service: General;  Laterality: N/A;    COLONOSCOPY      EXPLORATORY LAPAROTOMY         Family History:  family history includes Colon cancer in her mother; Colon polyps in her mother. Otherwise pertinent FHx was reviewed and unremarkable.     Social History:  reports that she has never  smoked. She has never used smokeless tobacco. She reports that she does not drink alcohol and does not use drugs.    Medications:  Estrogens Conjugated, HYDROcodone-acetaminophen, Pitavastatin Calcium, Zinc, ascorbic acid, aspirin, carvedilol, cetirizine, cyanocobalamin, ezetimibe, fluticasone, guaiFENesin, hydroCHLOROthiazide, melatonin, ondansetron, and pantoprazole    Scheduled Meds:acetaminophen, 1,000 mg, Oral, Q8H  alvimopan, 12 mg, Oral, BID  ascorbic acid, 500 mg, Oral, Daily  atorvastatin, 40 mg, Oral, Daily  carvedilol, 6.25 mg, Oral, Daily  cetirizine, 10 mg, Oral, Daily  enoxaparin, 40 mg, Subcutaneous, Daily  hydroCHLOROthiazide, 25 mg, Oral, Daily  melatonin, 3 mg, Oral, Nightly  [START ON 11/15/2023] pantoprazole, 40 mg, Oral, Q AM  senna-docusate sodium, 2 tablet, Oral, BID  sodium chloride, 100 mL/hr, Intravenous, Once      Continuous Infusions:   PRN Meds:.  senna-docusate sodium **AND** polyethylene glycol **AND** bisacodyl **AND** bisacodyl    calcium carbonate    diazePAM    fluticasone    guaiFENesin    HYDROmorphone    ondansetron    oxyCODONE    simethicone    Allergies   Allergen Reactions    Penicillins Hives, Other (See Comments) and Rash     vomiting    Vancomycin Hives, Itching and Unknown (See Comments)    Codeine Other (See Comments) and Rash     Sick at stomach    Sulfa Antibiotics Other (See Comments) and Rash     Sick at stomach       Objective   Objective     Vital Signs:   Temp:  [97.8 °F (36.6 °C)-98.8 °F (37.1 °C)] 98.3 °F (36.8 °C)  Heart Rate:  [65-92] 74  Resp:  [16-18] 18  BP: (108-123)/(67-86) 123/75  Flow (L/min):  [2] 2    Physical Exam  Constitutional: Alert, WD, WN female in NAD but appears in pain  ENT: Pink, moist mucous membranes   Respiratory: Nonlabored, symmetrical chest expansion, CTAB  Cardiovascular: RRR, no M/R/G, +DP pulses bilaterally  Gastrointestinal: Soft, generalized TTP, mildly distended +BS heard on the left but not right; abdominal dressing CDI with  no erythema surrounding the dressing  Musculoskeletal: RUIZ; no LE edema bilaterally  Neurologic: Oriented x4, strength symmetric in all extremities, follows all commands, CN II-XII intact, speech clear  Skin: No rashes on exposed skin  Psychiatric: Pleasant and cooperative; normal affect    Result Review:  I have personally reviewed the results from the time of admission and agree with these findings:  []  Laboratory  []  Radiology  []  EKG/Telemetry   []  Pathology  []  Old records  []  Other:  Most notable findings include:     LAB RESULTS:                              Brief Urine Lab Results       None          Microbiology Results (last 10 days)       ** No results found for the last 240 hours. **            Peripheral Block    Result Date: 11/13/2023  Anna Galindo CRNA     11/13/2023  3:20 PM Peripheral Block Pre-sedation assessment completed: 11/13/2023 2:58 PM Patient reassessed immediately prior to procedure Patient location during procedure: OR Start time: 11/13/2023 3:00 PM Stop time: 11/13/2023 3:04 PM Reason for block: at surgeon's request and post-op pain management Performed by Anesthesiologist: Dale Reinoso MD Preanesthetic Checklist Completed: patient identified, IV checked, site marked, risks and benefits discussed, surgical consent, monitors and equipment checked, pre-op evaluation and timeout performed Prep: Pt Position: supine Sterile barriers:cap, gloves, mask and washed/disinfected hands Prep: ChloraPrep Patient monitoring: blood pressure monitoring, continuous pulse oximetry and EKG Procedure Sedation: yes Performed under: general Guidance:ultrasound guided ULTRASOUND INTERPRETATION.  Using ultrasound guidance a 20 G gauge needle was placed in close proximity to the nerve, at which point, under ultrasound guidance anesthetic was injected in the area of the nerve and spread of the anesthesia was seen on ultrasound in close proximity thereto.  There were no abnormalities seen on  "ultrasound; a digital image was taken; and the patient tolerated the procedure with no complications. Images:still images obtained, printed/placed on chart Laterality:Bilateral Block Type:TAP Injection Technique:single-shot Needle Type:short-bevel and echogenic Needle Gauge:20 G Resistance on Injection: none Medications Used: dexamethasone sodium phosphate injection - Injection  4 mg - 11/13/2023 3:00:00 PM bupivacaine PF (MARCAINE) 0.25 % injection - Injection  60 mL - 11/13/2023 3:00:00 PM Medications Comment:Block Injection:  LA dose divided between Right and Left block Post Assessment Injection Assessment: negative aspiration for heme, incremental injection and no paresthesia on injection Patient Tolerance:comfortable throughout block Complications:no Additional Notes Subcostal TAPs A high-frequency linear transducer, with sterile cover, was placed sub-xiphoid to identify Linea Alba, right and left Rectus Abdominus Muscles (MARISEL). The transducer was moved either right or left subcostally to identify the MARISEL and the Transverse Abdominus Muscle (MASON). The insertion site was prepped in sterile fashion and then localized with 2-5 ml of 1% Lidocaine. Using ultrasound-guidance, a 20-gauge B-Marin 4\" Ultraplex 360 non-stimulating echogenic needle was advanced in plane, from medial to lateral, until the tip of the needle was in the fascial plane between the MARISEL and MASON. 1-3ml of preservative free normal saline was used to hydro-dissect the fascial planes. After the fascial plane was verified, the local anesthetic (LA) was injected. The procedure was repeated on the opposite side for bilateral coverage. Aspiration every 5 ml to prevent intravascular injection. Injection was completed with negative aspiration of blood and negative intravascular injection. Injection pressures were normal with minimal resistance. The subcostal approach to the TAP nerve block ideally anesthetizes the intercostal nerves T6-T9. " "Mid-Axillary/Lateral TAPs A high-frequency linear transducer, with sterile cover, was placed in the midaxillary line between the ASIS and costal margin. The External Oblique Muscle (EOM), Internal Oblique Muscle (IOM), Transverse Abdominus Muscle (MASON), and Peritoneum were identified. The insertion site was prepped in sterile fashion and then localized with 2-5 ml of 1% Lidocaine. Using ultrasound-guidance, a 20-gauge B-Marin 4\" Ultraplex 360 non-stimulating echogenic needle was advanced in plane, from medial to lateral, until the tip of the needle was in the fascial plane between the IOM and MASON. 1-3ml of preservative free normal saline was used to hydro-dissect the fascial planes. After the fascial plane was verified, the local anesthetic (LA) was injected. The procedure was repeated on the opposite side for bilateral coverage. Aspiration every 5 ml to prevent intravascular injection. Injection was completed with negative aspiration of blood and negative intravascular injection. Injection pressures were normal with minimal resistance. Midaxillary TAPs should reach intercostal nerves T10- T11 and the subcostal nerve T12.           Assessment & Plan   Assessment & Plan     Active Hospital Problems    Diagnosis  POA    Diverticulitis [K57.92]  Yes    Gastroesophageal reflux disease [K21.9]  Yes    Gastroparesis [K31.84]  Yes    Esophageal dysphagia [R13.19]  Yes      Resolved Hospital Problems    Diagnosis Date Resolved POA    **Diverticulitis large intestine [K57.32] 11/13/2023 Yes     Diverticulitis  --S/p takedown of splenic flexure, left ureterolysis, extensive pelvic enterolysis, proctoscopy and appendectomy on 11/13/2023 by Dr. Castillo  -- Pain medication per Dr. Castillo  -- Enteric with PRN meds to follow    GERD  --PO Protonix    Gastroparesis  Esophageal dysphagia  --Currently on clear liquids; Dr. Varghese to advance    Thank you for allowing [facility] Medicine Service to provide consultative care for " your patient, we will continue to follow while clinically appropriate.    Esperanza Yuan, APRN  11/14/23

## 2023-11-15 NOTE — PROGRESS NOTES
"Colon and Rectal [CSGA]    POD # 1    /67   Pulse 81   Temp 98.3 °F (36.8 °C) (Oral)   Resp 18   Ht 162.6 cm (64\")   Wt 70.4 kg (155 lb 3.3 oz)   SpO2 93%   BMI 26.64 kg/m²     Lab Results (last 24 hours)       Procedure Component Value Units Date/Time    Tissue Pathology Exam [[id number]] Collected: 11/13/23 1601    Specimen: Tissue from Large Intestine, Sigmoid Colon Updated: 11/14/23 0804            No intake/output data recorded.    Alert and oriented.  No nausea or vomiting.  Good pain control.  Abdomen soft.  Good UO.  Isolated sensory neuropathy mid thigh distally posterior on the right side.  Motor function is good.  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  Occasionally steroid injections are used for this.  We will see how the patient does.  No flatus or stool yet.  Stable post op course.  Pathology pending.  Order Name Source Comment Collection Info Order Time   TISSUE PATHOLOGY EXAM Large Intestine, Sigmoid Colon  Collected By: Chago Castillo MD 11/13/2023  4:04 PM     Release to patient   Routine Release        .    Chago Castillo MD  11/14/23  21:01 EST    "

## 2023-11-15 NOTE — PLAN OF CARE
Problem: Adult Inpatient Plan of Care  Goal: Plan of Care Review  Outcome: Ongoing, Progressing  Goal: Patient-Specific Goal (Individualized)  Outcome: Ongoing, Progressing  Goal: Absence of Hospital-Acquired Illness or Injury  Outcome: Ongoing, Progressing  Intervention: Identify and Manage Fall Risk  Recent Flowsheet Documentation  Taken 11/14/2023 2021 by Sue De La Cruz RN  Safety Promotion/Fall Prevention:   assistive device/personal items within reach   activity supervised   fall prevention program maintained   muscle strengthening facilitated   nonskid shoes/slippers when out of bed   safety round/check completed   room organization consistent  Intervention: Prevent Skin Injury  Recent Flowsheet Documentation  Taken 11/14/2023 2021 by Sue De La Cruz RN  Body Position: position changed independently  Intervention: Prevent and Manage VTE (Venous Thromboembolism) Risk  Recent Flowsheet Documentation  Taken 11/14/2023 2200 by Sue De La Cruz RN  Activity Management: ambulated outside room  Taken 11/14/2023 2021 by Sue De La Cruz RN  VTE Prevention/Management: patient refused intervention  Intervention: Prevent Infection  Recent Flowsheet Documentation  Taken 11/14/2023 2021 by Sue De La Cruz RN  Infection Prevention:   environmental surveillance performed   cohorting utilized   rest/sleep promoted  Goal: Optimal Comfort and Wellbeing  Outcome: Ongoing, Progressing  Intervention: Provide Person-Centered Care  Recent Flowsheet Documentation  Taken 11/14/2023 2021 by Sue De La Cruz RN  Trust Relationship/Rapport:   care explained   choices provided   emotional support provided   empathic listening provided   questions answered   questions encouraged   reassurance provided  Goal: Readiness for Transition of Care  Outcome: Ongoing, Progressing     Problem: Pain Acute  Goal: Acceptable Pain Control and Functional Ability  Outcome: Ongoing, Progressing   Goal Outcome Evaluation:            Plan of care reviewed w  pt

## 2023-11-15 NOTE — CASE MANAGEMENT/SOCIAL WORK
Continued Stay Note  [facility]     Patient Name: Shobha Parada  MRN: [id number]  Today's Date: 11/15/2023    Admit Date: 11/13/2023    Plan: home with home health   Discharge Plan       Row Name 11/15/23 0925       Plan    Plan home with home health    Patient/Family in Agreement with Plan yes    Plan Comments I met with this patient at the bedside. She has been approved for [facility] for SN and PT. A rolling walker was issued to her through [employer]. Her plan is home with home health and her daughter can transport. CM will continue to follow.    Final Discharge Disposition Code 06 - home with home health care                   Discharge Codes    No documentation.                 Expected Discharge Date and Time       Expected Discharge Date Expected Discharge Time    Nov 21, 2023               Zena Hannah RN

## 2023-11-16 ENCOUNTER — APPOINTMENT (OUTPATIENT)
Dept: GENERAL RADIOLOGY | Facility: HOSPITAL | Age: 63
End: 2023-11-16
Payer: MEDICARE

## 2023-11-16 LAB
ANION GAP SERPL CALCULATED.3IONS-SCNC: 10 MMOL/L (ref 5–15)
BACTERIA UR QL AUTO: ABNORMAL /HPF
BILIRUB UR QL STRIP: NEGATIVE
BUN SERPL-MCNC: 10 MG/DL (ref 8–23)
BUN/CREAT SERPL: 11.4 (ref 7–25)
CALCIUM SPEC-SCNC: 8.9 MG/DL (ref 8.6–10.5)
CHLORIDE SERPL-SCNC: 93 MMOL/L (ref 98–107)
CLARITY UR: CLEAR
CO2 SERPL-SCNC: 33 MMOL/L (ref 22–29)
COLOR UR: YELLOW
CREAT SERPL-MCNC: 0.88 MG/DL (ref 0.57–1)
DEPRECATED RDW RBC AUTO: 41 FL (ref 37–54)
EGFRCR SERPLBLD CKD-EPI 2021: 73.5 ML/MIN/1.73
ERYTHROCYTE [DISTWIDTH] IN BLOOD BY AUTOMATED COUNT: 12.7 % (ref 12.3–15.4)
GLUCOSE SERPL-MCNC: 99 MG/DL (ref 65–99)
GLUCOSE UR STRIP-MCNC: NEGATIVE MG/DL
HCT VFR BLD AUTO: 36 % (ref 34–46.6)
HGB BLD-MCNC: 11.8 G/DL (ref 12–15.9)
HGB UR QL STRIP.AUTO: ABNORMAL
HYALINE CASTS UR QL AUTO: ABNORMAL /LPF
KETONES UR QL STRIP: NEGATIVE
LEUKOCYTE ESTERASE UR QL STRIP.AUTO: NEGATIVE
MCH RBC QN AUTO: 28.9 PG (ref 26.6–33)
MCHC RBC AUTO-ENTMCNC: 32.8 G/DL (ref 31.5–35.7)
MCV RBC AUTO: 88 FL (ref 79–97)
NITRITE UR QL STRIP: NEGATIVE
PH UR STRIP.AUTO: 7 [PH] (ref 5–8)
PLATELET # BLD AUTO: 237 10*3/MM3 (ref 140–450)
PMV BLD AUTO: 8.3 FL (ref 6–12)
POTASSIUM SERPL-SCNC: 3 MMOL/L (ref 3.5–5.2)
POTASSIUM SERPL-SCNC: 3.2 MMOL/L (ref 3.5–5.2)
PROT UR QL STRIP: NEGATIVE
RBC # BLD AUTO: 4.09 10*6/MM3 (ref 3.77–5.28)
RBC # UR STRIP: ABNORMAL /HPF
REF LAB TEST METHOD: ABNORMAL
SODIUM SERPL-SCNC: 136 MMOL/L (ref 136–145)
SP GR UR STRIP: 1.01 (ref 1–1.03)
SQUAMOUS #/AREA URNS HPF: ABNORMAL /HPF
UROBILINOGEN UR QL STRIP: ABNORMAL
WBC # UR STRIP: ABNORMAL /HPF
WBC NRBC COR # BLD: 7.8 10*3/MM3 (ref 3.4–10.8)

## 2023-11-16 PROCEDURE — 74018 RADEX ABDOMEN 1 VIEW: CPT

## 2023-11-16 PROCEDURE — 25010000002 ONDANSETRON PER 1 MG: Performed by: COLON & RECTAL SURGERY

## 2023-11-16 PROCEDURE — 97116 GAIT TRAINING THERAPY: CPT

## 2023-11-16 PROCEDURE — 71045 X-RAY EXAM CHEST 1 VIEW: CPT

## 2023-11-16 PROCEDURE — 99232 SBSQ HOSP IP/OBS MODERATE 35: CPT | Performed by: NURSE PRACTITIONER

## 2023-11-16 PROCEDURE — 85027 COMPLETE CBC AUTOMATED: CPT | Performed by: NURSE PRACTITIONER

## 2023-11-16 PROCEDURE — 80048 BASIC METABOLIC PNL TOTAL CA: CPT | Performed by: NURSE PRACTITIONER

## 2023-11-16 PROCEDURE — 97530 THERAPEUTIC ACTIVITIES: CPT

## 2023-11-16 PROCEDURE — 81001 URINALYSIS AUTO W/SCOPE: CPT | Performed by: NURSE PRACTITIONER

## 2023-11-16 PROCEDURE — 25010000002 ENOXAPARIN PER 10 MG: Performed by: COLON & RECTAL SURGERY

## 2023-11-16 PROCEDURE — 84132 ASSAY OF SERUM POTASSIUM: CPT | Performed by: NURSE PRACTITIONER

## 2023-11-16 PROCEDURE — 25010000002 HYDROMORPHONE PER 4 MG: Performed by: COLON & RECTAL SURGERY

## 2023-11-16 RX ORDER — POTASSIUM CHLORIDE 750 MG/1
40 CAPSULE, EXTENDED RELEASE ORAL ONCE
Status: COMPLETED | OUTPATIENT
Start: 2023-11-16 | End: 2023-11-16

## 2023-11-16 RX ADMIN — PANTOPRAZOLE SODIUM 40 MG: 40 TABLET, DELAYED RELEASE ORAL at 05:48

## 2023-11-16 RX ADMIN — SIMETHICONE 80 MG: 80 TABLET, CHEWABLE ORAL at 20:09

## 2023-11-16 RX ADMIN — SIMETHICONE 80 MG: 80 TABLET, CHEWABLE ORAL at 12:23

## 2023-11-16 RX ADMIN — CETIRIZINE HYDROCHLORIDE 10 MG: 10 TABLET, FILM COATED ORAL at 08:30

## 2023-11-16 RX ADMIN — DIAZEPAM 5 MG: 5 TABLET ORAL at 20:09

## 2023-11-16 RX ADMIN — HYDROMORPHONE HYDROCHLORIDE 0.5 MG: 2 INJECTION, SOLUTION INTRAMUSCULAR; INTRAVENOUS; SUBCUTANEOUS at 06:04

## 2023-11-16 RX ADMIN — CARVEDILOL 6.25 MG: 6.25 TABLET, FILM COATED ORAL at 08:31

## 2023-11-16 RX ADMIN — OXYCODONE HYDROCHLORIDE AND ACETAMINOPHEN 500 MG: 500 TABLET ORAL at 08:30

## 2023-11-16 RX ADMIN — HYDROCHLOROTHIAZIDE 25 MG: 25 TABLET ORAL at 08:31

## 2023-11-16 RX ADMIN — Medication 5 MG: at 20:09

## 2023-11-16 RX ADMIN — ENOXAPARIN SODIUM 40 MG: 100 INJECTION SUBCUTANEOUS at 08:30

## 2023-11-16 RX ADMIN — ONDANSETRON 4 MG: 2 INJECTION INTRAMUSCULAR; INTRAVENOUS at 03:43

## 2023-11-16 RX ADMIN — HYDROMORPHONE HYDROCHLORIDE 0.5 MG: 2 INJECTION, SOLUTION INTRAMUSCULAR; INTRAVENOUS; SUBCUTANEOUS at 21:42

## 2023-11-16 RX ADMIN — SIMETHICONE 80 MG: 80 TABLET, CHEWABLE ORAL at 08:30

## 2023-11-16 RX ADMIN — POTASSIUM CHLORIDE 40 MEQ: 750 CAPSULE, EXTENDED RELEASE ORAL at 12:22

## 2023-11-16 RX ADMIN — ONDANSETRON 4 MG: 2 INJECTION INTRAMUSCULAR; INTRAVENOUS at 12:22

## 2023-11-16 RX ADMIN — DIAZEPAM 5 MG: 5 TABLET ORAL at 12:23

## 2023-11-16 RX ADMIN — OXYCODONE HYDROCHLORIDE 5 MG: 5 TABLET ORAL at 03:43

## 2023-11-16 RX ADMIN — OXYCODONE HYDROCHLORIDE 5 MG: 5 TABLET ORAL at 08:31

## 2023-11-16 RX ADMIN — OXYCODONE HYDROCHLORIDE 5 MG: 5 TABLET ORAL at 20:09

## 2023-11-16 RX ADMIN — SENNOSIDES AND DOCUSATE SODIUM 2 TABLET: 8.6; 5 TABLET ORAL at 08:31

## 2023-11-16 RX ADMIN — DIAZEPAM 5 MG: 5 TABLET ORAL at 03:43

## 2023-11-16 RX ADMIN — OXYCODONE HYDROCHLORIDE 5 MG: 5 TABLET ORAL at 12:23

## 2023-11-16 NOTE — PROGRESS NOTES
"Colon and Rectal [CSGA]    POD # 1    /77 (BP Location: Left arm, Patient Position: Sitting)   Pulse 84   Temp 97.6 °F (36.4 °C) (Axillary)   Resp 18   Ht 162.6 cm (64\")   Wt 70.4 kg (155 lb 3.3 oz)   SpO2 95%   BMI 26.64 kg/m²     Lab Results (last 24 hours)       Procedure Component Value Units Date/Time    Basic Metabolic Panel [[id number]]  (Abnormal) Collected: 11/16/23 0948    Specimen: Blood Updated: 11/16/23 1017     Glucose 99 mg/dL      BUN 10 mg/dL      Creatinine 0.88 mg/dL      Sodium 136 mmol/L      Potassium 3.2 mmol/L      Chloride 93 mmol/L      CO2 33.0 mmol/L      Calcium 8.9 mg/dL      BUN/Creatinine Ratio 11.4     Anion Gap 10.0 mmol/L      eGFR 73.5 mL/min/1.73     Narrative:      GFR Normal >60  Chronic Kidney Disease <60  Kidney Failure <15      CBC (No Diff) [[id number]]  (Abnormal) Collected: 11/16/23 0948    Specimen: Blood Updated: 11/16/23 1007     WBC 7.80 10*3/mm3      RBC 4.09 10*6/mm3      Hemoglobin 11.8 g/dL      Hematocrit 36.0 %      MCV 88.0 fL      MCH 28.9 pg      MCHC 32.8 g/dL      RDW 12.7 %      RDW-SD 41.0 fl      MPV 8.3 fL      Platelets 237 10*3/mm3     Tissue Pathology Exam [[id number]] Collected: 11/13/23 1601    Specimen: Tissue from Large Intestine, Sigmoid Colon Updated: 11/15/23 1330     Case Report --     Surgical Pathology Report                         Case: [id number]                                  Authorizing Provider:  Chago Castillo MD       Collected:           11/13/2023 04:01 PM          Ordering Location:     [facility]   Received:            11/14/2023 08:04 AM                                 OR                                                                           Pathologist:           Oleg Santiago MD                                                        Specimen:    Large Intestine, Sigmoid Colon, Sigmoid Colon and  Appendix,                                Clinical Information --     Diverticulitis         " Final Diagnosis --     SIGMOID COLON AND APPENDIX, PARTIAL COLECTOMY AND APPENDECTOMY:  Diverticulosis.  Appendix with no significant histopathologic change.       Gross Description --     1. Large Intestine, Sigmoid Colon.  Received in formalin labeled sigmoid colon and appendix is a 12 cm long by 4 cm in diameter unoriented portion of colon with attached fat.  Also received freely floating in the container is a 2.5 cm long by 0.5 cm in diameter appendix with a minimal amount of attached mesoappendix.  One margin of the colon is received open and the opposite margin is received stapled.  The serosa is pink and has diffuse adhesions.  The mucosa is tan, glistening and has a normal folding pattern.  No masses or polyps are grossly identified.  The bowel wall thickness averages 0.5 cm.  Sectioning reveals multiple diverticula scattered throughout the specimen.  No abscess formation is grossly identified.  The proximal margin of the appendix is inked green and sectioning reveals an unremarkable lumen is devoid of contents.  No masses or fecaliths are grossly identified.  The appendiceal wall thickness averages 0.2 cm.      Representative sections are submitted as follows:  1A-open margin, en face  1B-stapled margin, en face  [id number]  1E-appendix, submitted entirely with proximal margin submitted en face.  LDP       Microscopic Description --     The slides are reviewed and demonstrate histopathologic features supporting the above rendered diagnosis.        Magnesium [[id number]]  (Normal) Collected: 11/15/23 0958    Specimen: Blood Updated: 11/15/23 1259     Magnesium 1.8 mg/dL             No intake/output data recorded.    Alert and oriented.  No nausea or vomiting.  Good pain control  Good UO. Labs stable  No flatus or stool yet.  Stable post op course.  Pathology pending.  Order Name Source Comment Collection Info Order Time   TISSUE PATHOLOGY EXAM Large Intestine, Sigmoid Colon  Collected By: Anna  Chago MI MD 11/13/2023  4:04 PM     Release to patient   Routine Release        .    Chago Castillo MD  11/16/23  12:52 EST

## 2023-11-16 NOTE — THERAPY TREATMENT NOTE
Patient Name: Shobha Parada  : 1960    MRN: [id number]                              Today's Date: 2023       Admit Date: 2023    Visit Dx:     ICD-10-CM ICD-9-CM   1. Gastroparesis  K31.84 536.3   2. Diverticulitis  K57.92 562.11   3. Esophageal dysphagia  R13.19 787.29     Patient Active Problem List   Diagnosis    Gastroparesis    Esophageal dysphagia    Gastroesophageal reflux disease    Diverticulitis    Left lower quadrant abdominal pain     Past Medical History:   Diagnosis Date    Perez esophagus     Cholelithiasis     Colon polyp     Diverticulitis of colon     Elevated cholesterol     GERD (gastroesophageal reflux disease)     Hyperlipidemia     Irritable bowel syndrome     Kidney stones     Palpitations      Past Surgical History:   Procedure Laterality Date    ABDOMINAL HYSTERECTOMY      with unilateral oopherectomy    ANTERIOR CERVICAL DISCECTOMY W/ FUSION      ANTERIOR CERVICAL DISCECTOMY W/ FUSION      ANTERIOR CERVICAL DISCECTOMY W/ FUSION  2019    APPENDECTOMY N/A 2023    Procedure: APPENDECTOMY;  Surgeon: Chago Castillo MD;  Location: [facility] OR;  Service: General;  Laterality: N/A;    BREAST EXCISIONAL BIOPSY Left     x 4    CERVIX REMOVAL      open with oopherectomy    CHOLECYSTECTOMY      COLON RESECTION N/A 2023    Procedure: LOW ANTERIOR RESECTION WITH TAKEDOWN SPLENIC FLEXURE, LEFT URETEROLYSIS, EXTENSIVE PELVIC ENTEROLYSIS AND PROCTOSCOPY;  Surgeon: Chago Castillo MD;  Location: [facility] OR;  Service: General;  Laterality: N/A;    COLONOSCOPY      EXPLORATORY LAPAROTOMY        General Information       Row Name 23 1311          Physical Therapy Time and Intention    Document Type therapy note (daily note)  -     Mode of Treatment physical therapy  -       Row Name 23 1311          General Information    Patient Profile Reviewed yes  -     Existing Precautions/Restrictions other (see comments)  abd wound  -     Barriers to  Rehab none identified  -[facility] Name 11/16/23 1311          Cognition    Orientation Status (Cognition) oriented x 4  -[facility] Name 11/16/23 1311          Safety Issues, Functional Mobility    Safety Issues Affecting Function (Mobility) safety precaution awareness;sequencing abilities;insight into deficits/self-awareness  -     Impairments Affecting Function (Mobility) balance;endurance/activity tolerance;pain;strength  -               User Key  (r) = Recorded By, (t) = Taken By, (c) = Cosigned By      Initials Name Provider Type     Lorena Chapman, PT Physical Therapist                   Mobility       [facility] Name 11/16/23 1312          Bed Mobility    Comment, (Bed Mobility) Pt received and left UIC  -[facility] Name 11/16/23 1312          Transfers    Comment, (Transfers) VCs for hand palcement and sequencing w/ FWW. Requires increased time  -[facility] Name 11/16/23 1312          Sit-Stand Transfer    Sit-Stand [place] (Transfers) standby assist;verbal cues  -     Assistive Device (Sit-Stand Transfers) walker, front-wheeled  -     Comment, (Sit-Stand Transfer) STS x1 from recliner, x1 from toilet  -[facility] Name 11/16/23 1312          Gait/Stairs (Locomotion)    [place] Level (Gait) contact guard;verbal cues  -     Assistive Device (Gait) walker, front-wheeled  -     Distance in Feet (Gait) 350  -     Deviations/Abnormal Patterns (Gait) bilateral deviations;base of support, narrow;teressa decreased;gait speed decreased;stride length decreased  -     Bilateral Gait Deviations forward flexed posture;heel strike decreased  -     Comment, (Gait/Stairs) Pt increased ambulation distance this date, however is still limited most by pain. Pt ambulates at slow, steady speed and guarded posture. VCs for upright posture. Distance limited by pain and fatigue  -               User Key  (r) = Recorded By, (t) = Taken By, (c) = Cosigned By      Initials Name Provider Type      Lorena Chapman, PT Physical Therapist                   Obj/Interventions       Row Name 11/16/23 1315          Balance    Balance Assessment sitting static balance;sitting dynamic balance;sit to stand dynamic balance;standing static balance;standing dynamic balance  -     Static Sitting Balance independent  -     Dynamic Sitting Balance independent  -     Position, Sitting Balance supported;sitting in chair  -     Sit to Stand Dynamic Balance standby assist;verbal cues  -     Static Standing Balance standby assist;verbal cues  -     Dynamic Standing Balance contact guard;verbal cues  -     Position/Device Used, Standing Balance supported;walker, front-wheeled  -               User Key  (r) = Recorded By, (t) = Taken By, (c) = Cosigned By      Initials Name Provider Type     Lorena Chapman, PT Physical Therapist                   Goals/Plan    No documentation.                  Clinical Impression       [facility] Name 11/16/23 1316          Pain    Pretreatment Pain Rating 5/10  -     Posttreatment Pain Rating 5/10  -     Pain Location incisional  -     Pain Location - abdomen  -     Pre/Posttreatment Pain Comment tolerated  -     Pain Intervention(s) Repositioned;Ambulation/increased activity  -[facility] Name 11/16/23 1316          Plan of Care Review    Plan of Care Reviewed With patient;daughter  -     Progress improving  -     Outcome Evaluation Pt demonstrating improvements this date by ambulating 350 ft w/ FWW and CGA. Pt continues to present below baseline d/t pain, decreased strength, and decreased activity tolerance. Pt would continue to benefit from skilled IP PT. Recommend home w/ assist and HHPT at d/c.  -       Row Name 11/16/23 1316          Vital Signs    Pre Systolic BP Rehab 115  -LH     Pre Treatment Diastolic BP 77  -LH     Post Systolic BP Rehab 113  -LH     Post Treatment Diastolic BP 75  -LH     O2 Delivery Pre Treatment room air  -     O2 Delivery Intra  Treatment room air  -     O2 Delivery Post Treatment room air  -     Pre Patient Position Sitting  -     Intra Patient Position Standing  -     Post Patient Position Sitting  -       Row Name 11/16/23 1316          Positioning and Restraints    Pre-Treatment Position sitting in chair/recliner  -LH     Post Treatment Position chair  -LH     In Chair reclined;sitting;call light within reach;encouraged to call for assist;with family/caregiver;waffle cushion;legs elevated;notified nsg  -               User Key  (r) = Recorded By, (t) = Taken By, (c) = Cosigned By      Initials Name Provider Type     Lorena Chapman PT Physical Therapist                   Outcome Measures       Row Name 11/16/23 1319          How much help from another person do you currently need...    Turning from your back to your side while in flat bed without using bedrails? 3  -LH     Moving from lying on back to sitting on the side of a flat bed without bedrails? 3  -LH     Moving to and from a bed to a chair (including a wheelchair)? 3  -LH     Standing up from a chair using your arms (e.g., wheelchair, bedside chair)? 3  -LH     Climbing 3-5 steps with a railing? 3  -LH     To walk in hospital room? 3  -     AM-PAC 6 Clicks Score (PT) 18  -     Highest Level of Mobility Goal 6 --> Walk 10 steps or more  -       Row Name 11/16/23 1319          Functional Assessment    Outcome Measure Options AM-PAC 6 Clicks Basic Mobility (PT)  -               User Key  (r) = Recorded By, (t) = Taken By, (c) = Cosigned By      Initials Name Provider Type     Lorena Chapman PT Physical Therapist                                 Physical Therapy Education       Title: PT OT SLP Therapies (In Progress)       Topic: Physical Therapy (In Progress)       Point: Mobility training (Done)       Learning Progress Summary             Patient Acceptance, E, VU,NR by  at 11/16/2023 1319    Acceptance, E, VU,NR by  at 11/14/2023 0926   Family  Acceptance, E, VU,NR by  at 11/16/2023 1319    Acceptance, E, VU,NR by  at 11/14/2023 0926                         Point: Home exercise program (Not Started)       Learner Progress:  Not documented in this visit.              Point: Body mechanics (Done)       Learning Progress Summary             Patient Acceptance, E, VU,NR by  at 11/16/2023 1319    Acceptance, E, VU,NR by  at 11/14/2023 0926   Family Acceptance, E, VU,NR by  at 11/16/2023 1319    Acceptance, E, VU,NR by  at 11/14/2023 0926                         Point: Precautions (Done)       Learning Progress Summary             Patient Acceptance, E, VU,NR by  at 11/16/2023 1319    Acceptance, E, VU,NR by  at 11/14/2023 0926   Family Acceptance, E, VU,NR by  at 11/16/2023 1319    Acceptance, E, VU,NR by  at 11/14/2023 0926                                         User Key       Initials Effective Dates Name Provider Type Discipline     09/21/23 -  Lorena Chapman, PT Physical Therapist PT                  PT Recommendation and Plan  Planned Therapy Interventions (PT): balance training, bed mobility training, gait training, home exercise program, neuromuscular re-education, patient/family education, postural re-education, ROM (range of motion), strengthening, stretching, transfer training  Plan of Care Reviewed With: patient, daughter  Progress: improving  Outcome Evaluation: Pt demonstrating improvements this date by ambulating 350 ft w/ FWW and CGA. Pt continues to present below baseline d/t pain, decreased strength, and decreased activity tolerance. Pt would continue to benefit from skilled IP PT. Recommend home w/ assist and HHPT at d/c.     Time Calculation:   PT Evaluation Complexity  History, PT Evaluation Complexity: 1-2 personal factors and/or comorbidities  Examination of Body Systems (PT Eval Complexity): total of 4 or more elements  Clinical Presentation (PT Evaluation Complexity): stable  Clinical Decision Making (PT  Evaluation Complexity): low complexity  Overall Complexity (PT Evaluation Complexity): low complexity     PT Charges       Row Name 11/16/23 1319             Time Calculation    Start Time 1139  -      PT Received On 11/16/23  -      PT Goal Re-Cert Due Date 11/24/23  -         Timed Charges    32263 - Gait Training Minutes  15  -LH      23221 - PT Therapeutic Activity Minutes 9  -LH         Total Minutes    Timed Charges Total Minutes 24  -       Total Minutes 24  -LH                User Key  (r) = Recorded By, (t) = Taken By, (c) = Cosigned By      Initials Name Provider Type     Lorena Chapman, PT Physical Therapist                  Therapy Charges for Today       Code Description Service Date Service Provider Modifiers Qty    01026710697 HC GAIT TRAINING EA 15 MIN 11/16/2023 Lorena Chapman, PT GP 1    [id number]  PT THERAPEUTIC ACT EA 15 MIN 11/16/2023 Lorena Chapman, PT GP 1            PT G-Codes  Outcome Measure Options: AM-PAC 6 Clicks Basic Mobility (PT)  AM-PAC 6 Clicks Score (PT): 18  PT Discharge Summary  Anticipated Discharge Disposition (PT): home with assist, home with home health    Lorena Chapman, GERRY  11/16/2023

## 2023-11-16 NOTE — PLAN OF CARE
Problem: Adult Inpatient Plan of Care  Goal: Plan of Care Review  Outcome: Ongoing, Progressing  Goal: Patient-Specific Goal (Individualized)  Outcome: Ongoing, Progressing  Goal: Absence of Hospital-Acquired Illness or Injury  Outcome: Ongoing, Progressing  Intervention: Identify and Manage Fall Risk  Recent Flowsheet Documentation  Taken 11/16/2023 0800 by Alessia Sadler RN  Safety Promotion/Fall Prevention:   activity supervised   assistive device/personal items within reach   clutter free environment maintained   room organization consistent   safety round/check completed   toileting scheduled  Intervention: Prevent Skin Injury  Recent Flowsheet Documentation  Taken 11/16/2023 0800 by Alessia Sadler RN  Body Position: position changed independently  Skin Protection:   incontinence pads utilized   transparent dressing maintained  Intervention: Prevent and Manage VTE (Venous Thromboembolism) Risk  Recent Flowsheet Documentation  Taken 11/16/2023 0800 by Alessia Sadler RN  Activity Management: activity encouraged  VTE Prevention/Management: (lovenox) other (see comments)  Goal: Optimal Comfort and Wellbeing  Outcome: Ongoing, Progressing  Intervention: Monitor Pain and Promote Comfort  Recent Flowsheet Documentation  Taken 11/16/2023 0800 by Alessia Sadler RN  Pain Management Interventions:   care clustered   pillow support provided   see MAR   quiet environment facilitated  Goal: Readiness for Transition of Care  Outcome: Ongoing, Progressing     Problem: Pain Acute  Goal: Acceptable Pain Control and Functional Ability  Outcome: Ongoing, Progressing  Intervention: Develop Pain Management Plan  Recent Flowsheet Documentation  Taken 11/16/2023 0800 by Alessia Sadler RN  Pain Management Interventions:   care clustered   pillow support provided   see MAR   quiet environment facilitated     Problem: Fall Injury Risk  Goal: Absence of Fall and Fall-Related Injury  Outcome: Ongoing, Progressing  Intervention:  Promote Injury-Free Environment  Recent Flowsheet Documentation  Taken 11/16/2023 0800 by Alessia Sadler, RN  Safety Promotion/Fall Prevention:   activity supervised   assistive device/personal items within reach   clutter free environment maintained   room organization consistent   safety round/check completed   toileting scheduled     Problem: Skin Injury Risk Increased  Goal: Skin Health and Integrity  Outcome: Ongoing, Progressing  Intervention: Optimize Skin Protection  Recent Flowsheet Documentation  Taken 11/16/2023 0800 by Alessia Sadler RN  Pressure Reduction Devices:   chair cushion utilized   positioning supports utilized  Skin Protection:   incontinence pads utilized   transparent dressing maintained   Goal Outcome Evaluation:

## 2023-11-16 NOTE — PLAN OF CARE
Goal Outcome Evaluation:  Plan of Care Reviewed With: patient, daughter        Progress: improving  Outcome Evaluation: Pt demonstrating improvements this date by ambulating 350 ft w/ FWW and CGA. Pt continues to present below baseline d/t pain, decreased strength, and decreased activity tolerance. Pt would continue to benefit from skilled IP PT. Recommend home w/ assist and HHPT at d/c.      Anticipated Discharge Disposition (PT): home with assist, home with home health

## 2023-11-16 NOTE — PLAN OF CARE
Problem: Adult Inpatient Plan of Care  Goal: Plan of Care Review  Outcome: Ongoing, Progressing  Goal: Patient-Specific Goal (Individualized)  Outcome: Ongoing, Progressing  Goal: Absence of Hospital-Acquired Illness or Injury  Outcome: Ongoing, Progressing  Goal: Optimal Comfort and Wellbeing  Outcome: Ongoing, Progressing  Goal: Readiness for Transition of Care  Outcome: Ongoing, Progressing     Problem: Pain Acute  Goal: Acceptable Pain Control and Functional Ability  Outcome: Ongoing, Progressing     Problem: Fall Injury Risk  Goal: Absence of Fall and Fall-Related Injury  Outcome: Ongoing, Progressing     Problem: Skin Injury Risk Increased  Goal: Skin Health and Integrity  Outcome: Ongoing, Progressing   Goal Outcome Evaluation:                 Plan of care reviewed w lashaun

## 2023-11-16 NOTE — PROGRESS NOTES
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 difficile and she has diarrhea with bloating and distention and a low-grade fever.  Her wound is clean and her lungs are clear to ongoing to check her for C. difficile.  She lives 2 hours away and I would not want her on the road with C. difficile.  Pathology benign.

## 2023-11-16 NOTE — PROGRESS NOTES
"     [facility] Medicine Services  CONSULT NOTE      Patient Name: Shobha Parada  : 1960  MRN: [id number]    Primary Care Physician: Joelle Damico MD  Provider requesting consultation: No Known Provider    Subjective   Subjective     Reason for Consultation:  Medical management    HPI:  Patient states that her abdomen is distended and she has a whole lot of gas and is very uncomfortable.  She has still had no BM or flatus.  She states that the simethicone tablets are not helping with her \"gas\".  She states she still not tolerating her food, but she thinks it is because she does not like what she is being brought.  Denies any nausea or vomiting.  Is having soft stools.    Objective   Objective     Vital Signs:   Temp:  [96.6 °F (35.9 °C)-100.2 °F (37.9 °C)] 97.6 °F (36.4 °C)  Heart Rate:  [76-92] 84  Resp:  [16-22] 18  BP: (111-122)/(75-77) 115/77  Flow (L/min):  [2] 2    Physical Exam  Constitutional: Alert, WD, WN female in NAD but appears in pain sitting up in a chair  ENT: Pink, moist mucous membranes   Respiratory: Nonlabored, symmetrical chest expansion, CTAB  Cardiovascular: RRR, no M/R/G, +DP pulses bilaterally  Gastrointestinal: Soft, generalized TTP, +distended +BS, abdominal dressing has same drainage jess since placed after surgery with no erythema surrounding the dressing  Musculoskeletal: RUIZ; no LE edema bilaterally  Neurologic: Oriented x4, strength symmetric in all extremities, follows all commands, CN II-XII intact, speech clear  Skin: No rashes on exposed skin  Psychiatric: Pleasant and cooperative; normal affect    Result Review:  I have personally reviewed the results from the time of admission and agree with these findings:  []  Laboratory  []  Radiology  []  EKG/Telemetry   []  Pathology  []  Old records  []  Other:  Most notable findings include:     LAB RESULTS:      Lab 23  0948 11/15/23  0958   WBC 7.80 9.79   HEMOGLOBIN 11.8* 12.4   HEMATOCRIT " 36.0 38.3   PLATELETS 237 249   MCV 88.0 89.9         Lab 11/16/23  0948 11/15/23  0958   SODIUM 136 136   POTASSIUM 3.2* 3.0*   CHLORIDE 93* 93*   CO2 33.0* 35.0*   ANION GAP 10.0 8.0   BUN 10 14   CREATININE 0.88 0.87   EGFR 73.5 74.5   GLUCOSE 99 80   CALCIUM 8.9 9.4   MAGNESIUM  --  1.8                         Brief Urine Lab Results       None          Microbiology Results (last 10 days)       ** No results found for the last 240 hours. **            XR Chest 1 View    Result Date: 11/16/2023  XR CHEST 1 VW Date of Exam: 11/16/2023 8:21 AM EST Indication: fever Comparison: 11/6/2023 Findings: Anterior cervical fusion is again noted. The heart, mediastinum and pulmonary vasculature appear within normal months. Lungs appear moderately well expanded and clear except for trace linear atelectasis at the left lung base. No pneumothorax or effusion is identified..     Impression: Impression: Minimal left basilar atelectasis. No other new chest disease is seen. Electronically Signed: Artem Corona MD  11/16/2023 8:34 AM EST  Workstation ID: [id number]       Assessment & Plan   Assessment & Plan     Active Hospital Problems    Diagnosis  POA    Diverticulitis [K57.92]  Yes    Gastroesophageal reflux disease [K21.9]  Yes    Gastroparesis [K31.84]  Yes    Esophageal dysphagia [R13.19]  Yes      Resolved Hospital Problems    Diagnosis Date Resolved POA    **Diverticulitis large intestine [K57.32] 11/13/2023 Yes     Diverticulitis  Fever  --S/p takedown of splenic flexure, left ureterolysis, extensive pelvic enterolysis, proctoscopy and appendectomy on 11/13/2023 by Dr. Castillo  -- Pain medication per Dr. Castillo  -- Enteric with PRN meds to follow  -- Low grade fever  -- AM labs unremarkable except K+ 3.2, gave KCL 40meq x1, repeat potassium at 16 3 00  --CXR minimal left basilar atelectasis    GERD  --PO Protonix    Gastroparesis  Esophageal dysphagia  --Currently on soft diet; Dr. Varghese to advance    Thank you for allowing  [facility] Medicine Service to provide consultative care for your patient, we will continue to follow while clinically appropriate.    Esperanza Yuan, APRN  11/16/23

## 2023-11-17 VITALS
WEIGHT: 155.2 LBS | SYSTOLIC BLOOD PRESSURE: 103 MMHG | TEMPERATURE: 98 F | RESPIRATION RATE: 18 BRPM | OXYGEN SATURATION: 92 % | HEART RATE: 82 BPM | HEIGHT: 64 IN | BODY MASS INDEX: 26.5 KG/M2 | DIASTOLIC BLOOD PRESSURE: 72 MMHG

## 2023-11-17 PROBLEM — K57.92 DIVERTICULITIS: Status: RESOLVED | Noted: 2023-07-05 | Resolved: 2023-11-17

## 2023-11-17 LAB
ANION GAP SERPL CALCULATED.3IONS-SCNC: 11 MMOL/L (ref 5–15)
BUN SERPL-MCNC: 9 MG/DL (ref 8–23)
BUN/CREAT SERPL: 11.1 (ref 7–25)
CALCIUM SPEC-SCNC: 9.1 MG/DL (ref 8.6–10.5)
CHLORIDE SERPL-SCNC: 92 MMOL/L (ref 98–107)
CO2 SERPL-SCNC: 34 MMOL/L (ref 22–29)
CREAT SERPL-MCNC: 0.81 MG/DL (ref 0.57–1)
DEPRECATED RDW RBC AUTO: 41.2 FL (ref 37–54)
EGFRCR SERPLBLD CKD-EPI 2021: 81.2 ML/MIN/1.73
ERYTHROCYTE [DISTWIDTH] IN BLOOD BY AUTOMATED COUNT: 12.8 % (ref 12.3–15.4)
GLUCOSE SERPL-MCNC: 117 MG/DL (ref 65–99)
HCT VFR BLD AUTO: 35.5 % (ref 34–46.6)
HGB BLD-MCNC: 11.9 G/DL (ref 12–15.9)
MCH RBC QN AUTO: 29.3 PG (ref 26.6–33)
MCHC RBC AUTO-ENTMCNC: 33.5 G/DL (ref 31.5–35.7)
MCV RBC AUTO: 87.4 FL (ref 79–97)
PLATELET # BLD AUTO: 263 10*3/MM3 (ref 140–450)
PMV BLD AUTO: 8.3 FL (ref 6–12)
POTASSIUM SERPL-SCNC: 3.8 MMOL/L (ref 3.5–5.2)
RBC # BLD AUTO: 4.06 10*6/MM3 (ref 3.77–5.28)
SODIUM SERPL-SCNC: 137 MMOL/L (ref 136–145)
WBC NRBC COR # BLD AUTO: 5.68 10*3/MM3 (ref 3.4–10.8)

## 2023-11-17 PROCEDURE — 25010000002 ONDANSETRON PER 1 MG: Performed by: COLON & RECTAL SURGERY

## 2023-11-17 PROCEDURE — 99239 HOSP IP/OBS DSCHRG MGMT >30: CPT | Performed by: NURSE PRACTITIONER

## 2023-11-17 PROCEDURE — 25010000002 ENOXAPARIN PER 10 MG: Performed by: COLON & RECTAL SURGERY

## 2023-11-17 PROCEDURE — 85027 COMPLETE CBC AUTOMATED: CPT | Performed by: COLON & RECTAL SURGERY

## 2023-11-17 PROCEDURE — 80048 BASIC METABOLIC PNL TOTAL CA: CPT | Performed by: COLON & RECTAL SURGERY

## 2023-11-17 RX ORDER — POTASSIUM CHLORIDE 750 MG/1
CAPSULE, EXTENDED RELEASE ORAL
Status: DISCONTINUED
Start: 2023-11-17 | End: 2023-11-17 | Stop reason: HOSPADM

## 2023-11-17 RX ORDER — POTASSIUM CHLORIDE 750 MG/1
40 CAPSULE, EXTENDED RELEASE ORAL ONCE
Status: COMPLETED | OUTPATIENT
Start: 2023-11-17 | End: 2023-11-17

## 2023-11-17 RX ORDER — NALOXONE HYDROCHLORIDE 4 MG/.1ML
SPRAY NASAL
Qty: 2 EACH | Refills: 0 | Status: SHIPPED | OUTPATIENT
Start: 2023-11-17

## 2023-11-17 RX ORDER — ONDANSETRON 4 MG/1
4 TABLET, FILM COATED ORAL 3 TIMES DAILY
Qty: 15 TABLET | Refills: 0 | Status: SHIPPED | OUTPATIENT
Start: 2023-11-17

## 2023-11-17 RX ORDER — OXYCODONE HYDROCHLORIDE 5 MG/1
5 TABLET ORAL EVERY 6 HOURS PRN
Qty: 12 TABLET | Refills: 0 | Status: SHIPPED | OUTPATIENT
Start: 2023-11-17 | End: 2023-11-17 | Stop reason: HOSPADM

## 2023-11-17 RX ORDER — POTASSIUM CHLORIDE 750 MG/1
CAPSULE, EXTENDED RELEASE ORAL
Status: COMPLETED
Start: 2023-11-17 | End: 2023-11-17

## 2023-11-17 RX ADMIN — OXYCODONE HYDROCHLORIDE 5 MG: 5 TABLET ORAL at 06:00

## 2023-11-17 RX ADMIN — SIMETHICONE 80 MG: 80 TABLET, CHEWABLE ORAL at 08:10

## 2023-11-17 RX ADMIN — SIMETHICONE 80 MG: 80 TABLET, CHEWABLE ORAL at 10:40

## 2023-11-17 RX ADMIN — ENOXAPARIN SODIUM 40 MG: 100 INJECTION SUBCUTANEOUS at 08:10

## 2023-11-17 RX ADMIN — PANTOPRAZOLE SODIUM 40 MG: 40 TABLET, DELAYED RELEASE ORAL at 05:57

## 2023-11-17 RX ADMIN — SENNOSIDES AND DOCUSATE SODIUM 2 TABLET: 8.6; 5 TABLET ORAL at 08:10

## 2023-11-17 RX ADMIN — OXYCODONE HYDROCHLORIDE 5 MG: 5 TABLET ORAL at 14:09

## 2023-11-17 RX ADMIN — CARVEDILOL 6.25 MG: 6.25 TABLET, FILM COATED ORAL at 08:10

## 2023-11-17 RX ADMIN — POTASSIUM CHLORIDE 40 MEQ: 750 CAPSULE, EXTENDED RELEASE ORAL at 10:39

## 2023-11-17 RX ADMIN — HYDROCHLOROTHIAZIDE 25 MG: 25 TABLET ORAL at 08:10

## 2023-11-17 RX ADMIN — DIAZEPAM 5 MG: 5 TABLET ORAL at 08:18

## 2023-11-17 RX ADMIN — CETIRIZINE HYDROCHLORIDE 10 MG: 10 TABLET, FILM COATED ORAL at 08:11

## 2023-11-17 RX ADMIN — ONDANSETRON 4 MG: 2 INJECTION INTRAMUSCULAR; INTRAVENOUS at 08:25

## 2023-11-17 RX ADMIN — OXYCODONE HYDROCHLORIDE AND ACETAMINOPHEN 500 MG: 500 TABLET ORAL at 08:11

## 2023-11-17 NOTE — CASE MANAGEMENT/SOCIAL WORK
Case Management Discharge Note      Final Note: I spoke with this patient regarding her being discharged home today with [facility]. Her daughter can transport. [facility] was notified. No other discharge needs were needed by patient.         Selected Continued Care - Admitted Since 11/13/2023       Destination    No services have been selected for the patient.                Durable Medical Equipment    No services have been selected for the patient.                Dialysis/Infusion    No services have been selected for the patient.                Home Medical Care Coordination complete.      Service Provider Selected Services Address Phone Fax Patient Preferred    [facility] ,  Home Rehabilitation [address] [address] [phone] -- --              Therapy    No services have been selected for the patient.                Community Resources    No services have been selected for the patient.                Community & DME    No services have been selected for the patient.                         Final Discharge Disposition Code: 06 - home with home health care

## 2023-11-17 NOTE — DISCHARGE SUMMARY
[facility] Medicine Services  DISCHARGE SUMMARY    Patient Name: Shobha Parada  : 1960  MRN: [id number]    Date of Admission: 2023  Date of Discharge:  2023  Primary Care Physician: Joelle Damico MD    Consults       Date and Time Order Name Status Description    2023  6:36 PM Inpatient Hospitalist Consult              Hospital Course     Presenting Problem:   Diverticulitis large intestine [K57.32]    Active Hospital Problems    Diagnosis  POA    Gastroesophageal reflux disease [K21.9]  Yes    Gastroparesis [K31.84]  Yes    Esophageal dysphagia [R13.19]  Yes      Resolved Hospital Problems    Diagnosis Date Resolved POA    **Diverticulitis large intestine [K57.32] 2023 Yes    Diverticulitis [K57.92] 2023 Yes        Hospital Course:  Shobha Parada is a 63 y.o. female PMH gastroparesis, esophageal dysphagia, GERD, HLD, IBS presenting with diverticulitis planning for colon resection lower anterior appendectomy per Dr Castillo.       Discharge Follow Up Recommendations for labs/diagnostics:  Follow-up with Dr. Castillo 2023 at 9 AM  Follow up with PCP on  at 9:50 am- repeat K+ due to hypokalemia    Day of Discharge     HPI:   Patient alert and oriented x3, sitting in bed.  Patient reports abdominal discomfort but denies fever, chills, sweats, vomiting.        Vital Signs:   Temp:  [97.9 °F (36.6 °C)-99.1 °F (37.3 °C)] 98 °F (36.7 °C)  Heart Rate:  [80-86] 82  Resp:  [18-20] 18  BP: (103-116)/(69-73) 103/72     Physical Exam:  Constitutional: Awake, alert, NAD  HENT: NCAT, mucous membranes moist  Respiratory: Clear to auscultation bilaterally, nonlabored  Cardiovascular: RRR, no murmurs, rubs, or gallops  Gastrointestinal: Surgical incision CDI without erythema, appropriately tender  Musculoskeletal: No bilateral ankle edema  Psychiatric: Appropriate affect, cooperative  Neurologic: Oriented x 3, strength symmetric in all extremities,  "Cranial Nerves grossly intact to confrontation, speech clear  Skin: No rashes    Plan:  Diverticulitis  Fever  -S/p takedown splenic flexure, extensive pelvic enterolysis, proctoscopy, appendectomy on 11/13/2023.     Hypokalemia  -Required replacement      GERD  -Protonix    Gastroparesis  Esophageal dysphagia  -Soft diet      Pertinent  and/or Most Recent Results     Results from last 7 days   Lab Units 11/17/23  1048 11/16/23  1542 11/16/23  0948 11/15/23  0958   WBC 10*3/mm3 5.68  --  7.80 9.79   HEMOGLOBIN g/dL 11.9*  --  11.8* 12.4   HEMATOCRIT % 35.5  --  36.0 38.3   PLATELETS 10*3/mm3 263  --  237 249   SODIUM mmol/L 137  --  136 136   POTASSIUM mmol/L 3.8 3.0* 3.2* 3.0*   CHLORIDE mmol/L 92*  --  93* 93*   CO2 mmol/L 34.0*  --  33.0* 35.0*   BUN mg/dL 9  --  10 14   CREATININE mg/dL 0.81  --  0.88 0.87   GLUCOSE mg/dL 117*  --  99 80   CALCIUM mg/dL 9.1  --  8.9 9.4           Invalid input(s): \"PROT\", \"LABALBU\"        Invalid input(s): \"TG\", \"LDLCALC\", \"LDLREALC\"        Brief Urine Lab Results  (Last result in the past 365 days)        Color   Clarity   Blood   Leuk Est   Nitrite   Protein   CREAT   Urine HCG        11/16/23 1437 Yellow   Clear   Large (3+)   Negative   Negative   Negative                   Microbiology Results Abnormal       None            Imaging Results (All)       Procedure Component Value Units Date/Time    XR Abdomen KUB [[id number]] Collected: 11/16/23 1535     Updated: 11/16/23 1540    Narrative:      XR ABDOMEN KUB    Date of Exam: 11/16/2023 3:14 PM EST    Indication: increased abd distention    Comparison: None available.    Findings:  Vertical midline skin staple line is seen generally below the level of the umbilicus. Clips are seen in the gallbladder fossa. Gas is seen in normal caliber large and small bowel. No bowel wall edema or pneumatosis is appreciated. No supine signs of free   air are seen.          Impression:      Impression:  Nonobstructive bowel gas " pattern.      Electronically Signed: Artem Corona MD    11/16/2023 3:37 PM EST    Workstation ID: [id number]    XR Chest 1 View [[id number]] Collected: 11/16/23 0833     Updated: 11/16/23 0837    Narrative:      XR CHEST 1 VW    Date of Exam: 11/16/2023 8:21 AM EST    Indication: fever    Comparison: 11/6/2023    Findings:  Anterior cervical fusion is again noted. The heart, mediastinum and pulmonary vasculature appear within normal months. Lungs appear moderately well expanded and clear except for trace linear atelectasis at the left lung base. No pneumothorax or effusion   is identified..      Impression:      Impression:  Minimal left basilar atelectasis. No other new chest disease is seen.      Electronically Signed: Artem Corona MD    11/16/2023 8:34 AM EST    Workstation ID: [id number]    SCANNED - IMAGING [[id number]] Resulted: 11/13/23     Updated: 11/10/23 1027                          Discharge Details        Discharge Medications        New Medications        Instructions Start Date   naloxone 4 MG/0.1ML nasal spray  Commonly known as: NARCAN   Call 911. Don't prime. [place] in 1 nostril for overdose. Repeat in 2-3 minutes in other nostril if no or minimal breathing/responsiveness.             Continue These Medications        Instructions Start Date   ascorbic acid 500 MG tablet  Commonly known as: VITAMIN C   500 mg, Oral, Daily      carvedilol 6.25 MG tablet  Commonly known as: COREG   1 tablet, Oral, Daily      cetirizine 10 MG tablet  Commonly known as: zyrTEC   10 mg, Oral, Daily      cyanocobalamin 1000 MCG tablet  Commonly known as: VITAMIN B-12   1,000 mcg, Oral, Daily      ELITE ZINC PO   Oral      ezetimibe 10 MG tablet  Commonly known as: ZETIA   1 tablet, Oral, Daily      fluticasone 50 MCG/ACT nasal spray  Commonly known as: FLONASE       guaiFENesin 600 MG 12 hr tablet  Commonly known as: MUCINEX   1,200 mg, Oral, As Needed      hydroCHLOROthiazide 25 MG tablet  Commonly known as: HYDRODIURIL   25 mg,  Oral, Daily      HYDROcodone-acetaminophen  MG per tablet  Commonly known as: NORCO       Livalo 4 MG tablet  Generic drug: Pitavastatin Calcium   Oral      melatonin 3 MG tablet   3 mg, Oral      ondansetron 4 MG tablet  Commonly known as: ZOFRAN   4 mg, Oral, 3 Times Daily      pantoprazole 40 MG EC tablet  Commonly known as: PROTONIX   40 mg, Oral      Premarin 0.625 MG/GM vaginal cream  Generic drug: Estrogens Conjugated   INSERT 1 GRAM VAGINALLY TWO TIMES WEEKLY             Stop These Medications      aspirin 81 MG EC tablet              Allergies   Allergen Reactions    Penicillins Hives, Other (See Comments) and Rash     vomiting    Vancomycin Hives, Itching and Unknown (See Comments)    Codeine Other (See Comments) and Rash     Sick at stomach    Sulfa Antibiotics Other (See Comments) and Rash     Sick at stomach         Discharge Disposition:  [place] [facility] Diet:  Diet Order   Procedures    Diet: Gastrointestinal Diets; Fiber-Restricted; Texture: Regular Texture (IDDSI 7); Fluid Consistency: Thin (IDDSI 0)         Discharge Activity:   Activity Instructions       Activity as Tolerated      Driving Restrictions      Type of Restriction: Driving    Driving Restrictions: No Driving While Taking Narcotics    Up WIth Assist                CODE STATUS:    Code Status and Medical Interventions:   Ordered at: 11/13/23 1836     Code Status (Patient has no pulse and is not breathing):    CPR (Attempt to Resuscitate)     Medical Interventions (Patient has pulse or is breathing):    Full         No future appointments.    Additional Instructions for the Follow-ups that You Need to Schedule       Ambulatory Referral to Home Health   As directed      Pt has incisional wound from Resection and takedown    Order Comments: Pt has incisional wound from Resection and takedown    Face to Face Visit Date: 11/14/2023   Follow-up provider for Plan of Care?: I treated the patient in an acute care facility  and will not continue treatment after discharge.   Follow-up provider: JOELLE CHEEMA [[id number]]   Reason/Clinical Findings: status post hospital stay   Describe mobility limitations that make leaving home difficult: impaired physical mobility and gait endurance; weakness   Nursing/Therapeutic Services Requested: Skilled Nursing Physical Therapy   Skilled nursing orders: Medication education Cardiopulmonary assessments   PT orders: Therapeutic exercise Gait Training Transfer training Strengthening Home safety assessment   Weight Bearing Status: As Tolerated   Frequency: 1 Week 1        Call MD With Problems / Concerns   As directed      Instructions: Call provider for temperature greater than 100.4, nausea, vomiting, diarrhea, chest pain, heart palpitations, shortness of breath    Order Comments: Instructions: Call provider for temperature greater than 100.4, nausea, vomiting, diarrhea, chest pain, heart palpitations, shortness of breath         Discharge Follow-up with PCP   As directed       Currently Documented PCP:    Joelle Cheema MD    PCP Phone Number:    [phone]     Follow Up Details: F/U 1 week        Discharge Follow-up with Specified Provider: Dr Castillo; 1 Week   As directed      To: Dr Castillo   Follow Up: 1 Week                Time Spent on Discharge:  35 minutes    Electronically signed by MICKIE Pace, 11/17/23, 3:03 PM EST.

## 2023-11-17 NOTE — CASE MANAGEMENT/SOCIAL WORK
Continued Stay Note   [facility]     Patient Name: Shobha Parada  MRN: [id number]  Today's Date: 11/17/2023    Admit Date: 11/13/2023    Plan: home with home health   Discharge Plan       Row Name 11/17/23 0834       Plan    Plan home with home health    Patient/Family in Agreement with Plan yes    Plan Comments I met with the patient at the bedside. She states she is feeling a lot better. She anticipates returning home after this hospitalization with [facility] for SN and PT for which she has been approved. Her daughter can transport. CM will follow.    Final Discharge Disposition Code 06 - home with home health care                   Discharge Codes    No documentation.                 Expected Discharge Date and Time       Expected Discharge Date Expected Discharge Time    Nov 21, 2023               Zena Hannah RN

## 2023-11-17 NOTE — PLAN OF CARE
Problem: Adult Inpatient Plan of Care  Goal: Plan of Care Review  Outcome: Ongoing, Progressing  Flowsheets (Taken 11/17/2023 0420)  Progress: improving  Plan of Care Reviewed With: patient  Goal: Patient-Specific Goal (Individualized)  Outcome: Ongoing, Progressing  Goal: Absence of Hospital-Acquired Illness or Injury  Outcome: Ongoing, Progressing  Intervention: Identify and Manage Fall Risk  Recent Flowsheet Documentation  Taken 11/17/2023 0400 by Amador Rowan RN  Safety Promotion/Fall Prevention: safety round/check completed  Taken 11/17/2023 0200 by Amador Rowan RN  Safety Promotion/Fall Prevention: safety round/check completed  Taken 11/17/2023 0000 by Amador Rowan RN  Safety Promotion/Fall Prevention: safety round/check completed  Taken 11/16/2023 2200 by Amador Rowan RN  Safety Promotion/Fall Prevention: safety round/check completed  Taken 11/16/2023 2000 by Amador Rowan RN  Safety Promotion/Fall Prevention:   assistive device/personal items within reach   nonskid shoes/slippers when out of bed   room organization consistent   safety round/check completed  Intervention: Prevent Skin Injury  Recent Flowsheet Documentation  Taken 11/17/2023 0400 by Amador Rowan RN  Body Position: position changed independently  Taken 11/17/2023 0200 by Amador Rowan RN  Body Position: position changed independently  Taken 11/17/2023 0000 by Amador Rowan RN  Body Position: position changed independently  Intervention: Prevent and Manage VTE (Venous Thromboembolism) Risk  Recent Flowsheet Documentation  Taken 11/16/2023 2200 by Amador Roawn RN  Activity Management: ambulated outside room  Taken 11/16/2023 2000 by Amador Rowan RN  VTE Prevention/Management: (lovenox)   bilateral   sequential compression devices on  Intervention: Prevent Infection  Recent Flowsheet Documentation  Taken 11/17/2023 0400 by Amador Rowan RN  Infection Prevention: environmental surveillance performed  Taken 11/17/2023 0000 by Amador Rowan  RN  Infection Prevention: environmental surveillance performed  Taken 11/16/2023 2000 by Amador Rowan RN  Infection Prevention: environmental surveillance performed  Goal: Optimal Comfort and Wellbeing  Outcome: Ongoing, Progressing  Intervention: Monitor Pain and Promote Comfort  Recent Flowsheet Documentation  Taken 11/16/2023 2142 by Amador Rowan, RN  Pain Management Interventions: see MAR  Goal: Readiness for Transition of Care  Outcome: Ongoing, Progressing   Goal Outcome Evaluation:  Plan of Care Reviewed With: patient        Progress: improving

## 2023-11-17 NOTE — PROGRESS NOTES
"Colon and Rectal [CS[facility]]    POD # 4 sigmoid colectomy with enterolysis and appendectomy  Potassium apparently is now 3.8  Tolerating p.o. although not much appetite as expected  Her wound is very clean and I showed the daughter how to take the staple out since they live 2 hours away and normally she would be back on Thanksgiving day.  I told her to take them out next Friday and if she has any questions she can get a hold of a seizure at that time.  They were amenable to that.  She is also going to look around to see if there is a nurse nearby or possibly their family physician.  We are concerned about C. difficile before but she has not had a liquid stool in nearly 24 hours so we will stop looking.  Home today  We will follow-up by phone next week.  She knows she can call at any time.  Home per the hospitalist today.    /72 (BP Location: Right arm, Patient Position: Sitting)   Pulse 82   Temp 98 °F (36.7 °C) (Oral)   Resp 18   Ht 162.6 cm (64\")   Wt 70.4 kg (155 lb 3.3 oz)   SpO2 92%   BMI 26.64 kg/m²     Lab Results (last 24 hours)       Procedure Component Value Units Date/Time    Basic Metabolic Panel [[id number]]  (Abnormal) Collected: 11/17/23 1048    Specimen: Blood Updated: 11/17/23 1148     Glucose 117 mg/dL      BUN 9 mg/dL      Creatinine 0.81 mg/dL      Sodium 137 mmol/L      Potassium 3.8 mmol/L      Comment: Slight hemolysis detected by analyzer. Result may be falsely elevated.        Chloride 92 mmol/L      CO2 34.0 mmol/L      Calcium 9.1 mg/dL      BUN/Creatinine Ratio 11.1     Anion Gap 11.0 mmol/L      eGFR 81.2 mL/min/1.73     Narrative:      GFR Normal >60  Chronic Kidney Disease <60  Kidney Failure <15      CBC (No Diff) [[id number]]  (Abnormal) Collected: 11/17/23 1048    Specimen: Blood Updated: 11/17/23 1131     WBC 5.68 10*3/mm3      RBC 4.06 10*6/mm3      Hemoglobin 11.9 g/dL      Hematocrit 35.5 %      MCV 87.4 fL      MCH 29.3 pg      MCHC 33.5 g/dL      RDW 12.8 %      RDW-SD " 41.2 fl      MPV 8.3 fL      Platelets 263 10*3/mm3     Potassium [[id number]]  (Abnormal) Collected: 11/16/23 1542    Specimen: Blood Updated: 11/16/23 1700     Potassium 3.0 mmol/L     Urinalysis, Microscopic Only - Straight Cath [[id number]]  (Abnormal) Collected: 11/16/23 1437    Specimen: Urine from Straight Cath Updated: 11/16/23 1514     RBC, UA 3-5 /HPF      WBC, UA 0-2 /HPF      Bacteria, UA None Seen /HPF      Squamous Epithelial Cells, UA 0-2 /HPF      Hyaline Casts, UA None Seen /LPF      Methodology Manual Light Microscopy    Urinalysis With Microscopic If Indicated (No Culture) - Straight Cath [[id number]]  (Abnormal) Collected: 11/16/23 1437    Specimen: Urine from Straight Cath Updated: 11/16/23 1513     Color, UA Yellow     Appearance, UA Clear     pH, UA 7.0     Specific Gravity, UA 1.006     Glucose, UA Negative     Ketones, UA Negative     Bilirubin, UA Negative     Blood, UA Large (3+)     Protein, UA Negative     Leuk Esterase, UA Negative     Nitrite, UA Negative     Urobilinogen, UA 0.2 E.U./dL            I/O this shift:  In: 720 [P.O.:720]  Out: -       Order Name Source Comment Collection Info Order Time   TISSUE PATHOLOGY EXAM Large Intestine, Sigmoid Colon  Collected By: Chago Castillo MD 11/13/2023  4:04 PM     Release to patient   Routine Release        .    Chago Castillo MD  11/17/23  15:09 EST

## 2023-11-18 ENCOUNTER — READMISSION MANAGEMENT (OUTPATIENT)
Dept: CALL CENTER | Facility: HOSPITAL | Age: 63
End: 2023-11-18
Payer: MEDICARE

## 2023-11-18 NOTE — OUTREACH NOTE
Prep Survey      Flowsheet Row Responses   [facility] facility patient discharged from? [facility]   Is LACE score < 7 ? No   Eligibility Readm Mgmt   Discharge diagnosis Gastroesophageal reflux diseaseDiverticulitis large intestine   Does the patient have one of the following disease processes/diagnoses(primary or secondary)? Other   Does the patient have Home health ordered? Yes   What is the Home health agency?  [facility]   Is there a DME ordered? No   Prep survey completed? Yes            CASTILLO MCKEON - Registered Nurse

## 2023-11-22 ENCOUNTER — READMISSION MANAGEMENT (OUTPATIENT)
Dept: CALL CENTER | Facility: HOSPITAL | Age: 63
End: 2023-11-22
Payer: MEDICARE

## 2023-11-22 NOTE — OUTREACH NOTE
Medical Week 1 Survey      Flowsheet Row Responses   [facility] patient discharged from? [place]   Does the patient have one of the following disease processes/diagnoses(primary or secondary)? Other   Week 1 attempt successful? Yes   Call start time 0808   Call end time 0816   Discharge diagnosis Gastroesophageal reflux diseaseDiverticulitis large intestine/ appendectomy   Meds reviewed with patient/caregiver? Yes   Is the patient taking all medications as directed (includes completed medication regime)? Yes   Does the patient have a primary care provider?  Yes   Does the patient have an appointment with their PCP within 7 days of discharge? Yes   Has the patient kept scheduled appointments due by today? N/A   Comments AVS shows surgeon appt 11/30/23 however she states that day of discharge Dr told her she did not have to FU. Pt will call office to confirm.   What is the Home health agency?  [facility]   Has home health visited the patient within 72 hours of discharge? Yes   Psychosocial issues? No   Did the patient receive a copy of their discharge instructions? Yes   Nursing interventions Reviewed instructions with patient   What is the patient's perception of their health status since discharge? Improving   Is the patient/caregiver able to teach back signs and symptoms related to disease process for when to call PCP? Yes   Is the patient/caregiver able to teach back signs and symptoms related to disease process for when to call 911? Yes   Is the patient/caregiver able to teach back the hierarchy of who to call/visit for symptoms/problems? PCP, Specialist, Home health nurse, Urgent Care, ED, 911 Yes   Week 1 call completed? Yes   Graduated Yes   Graduated/Revoked comments Pt doing well over all. Pt is aware to monitor surgical site for s/s of infection. States HH will remove sutures next week.   Call end time 0816            MITALI PASCUAL - Registered Nurse

## (undated) DEVICE — SUT PROLN 2/0 PC3 8833H

## (undated) DEVICE — LEX BASIC NO DRAPE: Brand: MEDLINE INDUSTRIES, INC.

## (undated) DEVICE — TBG PENCL TELESCP MEGADYNE SMOKE EVAC 10FT

## (undated) DEVICE — SUT VIC 12X27 D8116 BX/12

## (undated) DEVICE — DRAPE, LAVH, STERILE: Brand: MEDLINE

## (undated) DEVICE — SUT PDS 1 CTX 36IN VIO PDP371T

## (undated) DEVICE — PENCL ROCKRSWCH MEGADYNE W/HOLSTR 10FT SS

## (undated) DEVICE — PK MINOR SPLT 10

## (undated) DEVICE — PREMIUM DRY TRAY LF: Brand: MEDLINE INDUSTRIES, INC.

## (undated) DEVICE — JELLY,LUBE,STERILE,FLIP TOP,TUBE,2-OZ: Brand: MEDLINE

## (undated) DEVICE — GOWN,NON-REINFORCED,SIRUS,SET IN SLV,XL: Brand: MEDLINE

## (undated) DEVICE — TOTAL TRAY, 16FR 10ML SIL FOLEY, URN: Brand: MEDLINE

## (undated) DEVICE — DRSNG WND BORDR/ADHS NONADHR/GZ LF 4X10IN STRL

## (undated) DEVICE — GLV SURG SENSICARE PI MIC PF SZ8.5 LF STRL

## (undated) DEVICE — CONN TBG Y 5 IN 1 LF STRL